# Patient Record
Sex: MALE | Race: BLACK OR AFRICAN AMERICAN | NOT HISPANIC OR LATINO | Employment: FULL TIME
[De-identification: names, ages, dates, MRNs, and addresses within clinical notes are randomized per-mention and may not be internally consistent; named-entity substitution may affect disease eponyms.]

---

## 2018-06-20 ENCOUNTER — RECORDS - HEALTHEAST (OUTPATIENT)
Dept: ADMINISTRATIVE | Facility: OTHER | Age: 28
End: 2018-06-20

## 2019-12-05 ENCOUNTER — HOSPITAL ENCOUNTER (EMERGENCY)
Facility: CLINIC | Age: 29
Discharge: HOME | End: 2019-12-05
Attending: EMERGENCY MEDICINE | Admitting: EMERGENCY MEDICINE

## 2019-12-05 VITALS
HEART RATE: 77 BPM | DIASTOLIC BLOOD PRESSURE: 67 MMHG | RESPIRATION RATE: 16 BRPM | SYSTOLIC BLOOD PRESSURE: 135 MMHG | TEMPERATURE: 98.3 F | OXYGEN SATURATION: 98 %

## 2019-12-05 DIAGNOSIS — J02.9 SORE THROAT: ICD-10-CM

## 2019-12-05 DIAGNOSIS — R09.89 CHEST CONGESTION: ICD-10-CM

## 2019-12-05 LAB
DEPRECATED S PYO AG THROAT QL EIA: NORMAL
SPECIMEN SOURCE: NORMAL

## 2019-12-05 PROCEDURE — 99283 EMERGENCY DEPT VISIT LOW MDM: CPT

## 2019-12-05 PROCEDURE — 87880 STREP A ASSAY W/OPTIC: CPT | Performed by: EMERGENCY MEDICINE

## 2019-12-05 PROCEDURE — 87081 CULTURE SCREEN ONLY: CPT | Performed by: EMERGENCY MEDICINE

## 2019-12-05 ASSESSMENT — ENCOUNTER SYMPTOMS
SHORTNESS OF BREATH: 1
VOMITING: 0
FEVER: 0
COUGH: 0
RHINORRHEA: 1
SORE THROAT: 1
BACK PAIN: 1

## 2019-12-05 NOTE — ED NOTES
Charge RN Lalo states patient & family member in [facility].  Did not want to wait & were ok with leaving without their discharge paperwork per DANIEL May.

## 2019-12-05 NOTE — ED AVS SNAPSHOT
Emergency Department  [address] [place] [id number]  Phone:  [phone]  Fax:  [phone]                                    Lui Garcia   MRN: [id number]    Department:   Emergency Department   Date of Visit:  12/5/2019           After Visit Summary Signature Page    I have received my discharge instructions, and my questions have been answered. I have discussed any challenges I see with this plan with the nurse or doctor.    ..........................................................................................................................................  Patient/Patient Representative Signature      ..........................................................................................................................................  Patient Representative Print Name and Relationship to Patient    ..................................................               ................................................  Date                                   Time    ..........................................................................................................................................  Reviewed by Signature/Title    ...................................................              ..............................................  Date                                               Time          22EPIC Rev 08/18

## 2019-12-05 NOTE — ED PROVIDER NOTES
History     Chief Complaint:  Shortness of Breath    TESSA Garcia is a 29 year old male who presents with shortness of breath. The patient states that four days ago he developed a runny nose that progressed into a sore throat and ear pain. The patient states that his back started hurting today and he was concerned that he might be developing pneumonia again. The patient denies fever, cough, or vomiting.     Allergies:  Amoxicillin  Penicillin    Medications:    Medications reviewed. No current medications.     Past Medical History:    Pneumonia  Generalized anxiety disorder  Sinusitis    Past Surgical History:    Surgical history reviewed. No pertinent surgical history.    Family History:    Family history reviewed. No pertinent family history.     Social History:  The patient was accompanied to the ED by mother.  Smoking Status: Never Smoker  Alcohol Use: No     Review of Systems   Constitutional: Negative for fever.   HENT: Positive for ear pain, rhinorrhea and sore throat.    Respiratory: Positive for shortness of breath. Negative for cough.    Gastrointestinal: Negative for vomiting.   Musculoskeletal: Positive for back pain.   All other systems reviewed and are negative.    Physical Exam     Patient Vitals for the past 24 hrs:   BP Temp Temp src Pulse Resp SpO2   12/05/19 0109 135/67 98.3  F (36.8  C) Oral 77 16 98 %     Physical Exam    General: Resting comfortably on the gurney  Eyes:  The pupils are equal and round    Conjunctivae and sclerae are normal  ENT:    Moist mucous membranes, mild posterior oropharynx erythema with no swelling, TM clear bilaterally. Voice normal  Neck:  Normal range of motion  CV:  Regular rate and rhythm    Skin warm and well perfused   Resp:  Lungs are clear    Non-labored    No rales    No wheezing   GI:  Abdomen is soft, there is no rigidity    No distension    No rebound tenderness     No abdominal tenderness  MS:  Normal muscular tone  Skin:  No rash or acute skin  lesions noted  Neuro:   Awake, alert.      Speech is normal and fluent.    Face is symmetric.     Moves all extremities equally  Psych: Normal affect.  Appropriate interactions.    Emergency Department Course   Laboratory:  Laboratory findings were communicated with the patient who voiced understanding of the findings.    Rapid Strep Test: negative  Strep Culture: Pending    Emergency Department Course:  Nursing notes and vitals reviewed.    0114 I performed an exam of the patient as documented above.     Rapid strep and culture obtained, results above.     Findings and plan explained to the Patient. Patient discharged home with instructions regarding supportive care, medications, and reasons to return. The importance of close follow-up was reviewed.     Impression & Plan    Medical Decision Making:  Lui Garcia is a 29 year old male who presents for evaluation of a sore throat and clinical evidence of mild pharyngitis.  The rapid strep test is negative, and formal culture has been set up in the lab. Likely viral illness. There is no clinical evidence of peritonsillar abscess, retropharyngeal abscess, Lemierre's Syndrome, epiglottis, or Moses's angina. The etiology is most likely viral. Has clear lungs, no hypoxia or tachycardia with no fever or cough so doubt pneumonia.  I have recommended treatment with analgesics, OTC cough/cold medicine. Reasons to return to ED discussed with patient.     Diagnosis:    ICD-10-CM    1. Sore throat J02.9    2. Chest congestion R09.89      Disposition:   The patient is discharged to home.    Scribe Disclosure:  LEEANNE Gloriajustin Palacios, am serving as a scribe at 1:19 AM on 12/5/2019 to document services personally performed by Haylee Espino MD based on my observations and the provider's statements to me.    EMERGENCY DEPARTMENT       Haylee Espino MD  12/05/19 4643

## 2019-12-07 LAB
BACTERIA SPEC CULT: NORMAL
Lab: NORMAL
SPECIMEN SOURCE: NORMAL

## 2020-03-16 ENCOUNTER — VIRTUAL VISIT (OUTPATIENT)
Dept: FAMILY MEDICINE | Facility: OTHER | Age: 30
End: 2020-03-16

## 2020-03-16 ENCOUNTER — COMMUNICATION - HEALTHEAST (OUTPATIENT)
Dept: SCHEDULING | Facility: CLINIC | Age: 30
End: 2020-03-16

## 2020-03-16 NOTE — PROGRESS NOTES
"Date: 2020 01:59:06  Clinician: Renita Bartlett  Clinician NPI: [id number]  Patient: Lui White  Patient : 1990  Patient Address: [address] [place] [address]  Patient Phone: [phone]  Visit Protocol: URI  Patient Summary:  Lui is a 29 year old ( : 1990 ) male who initiated a Visit for COVID-19 (Coronavirus) evaluation and screening. When asked the question \"Please sign me up to receive news, health information and promotions. \", Lui responded \"No\".    Lui states his symptoms started 1-2 days ago.   His symptoms consist of malaise, myalgia, chills, and a sore throat. He is experiencing mild difficulty breathing with activities but can speak normally in full sentences. Lui also feels feverish.   Symptom details     Sore throat: Lui reports having moderate throat pain (4-6 on a 10 point pain scale), does not have exudate on his tonsils, and can swallow liquids. He is not sure if the lymph nodes in his neck are enlarged. A rash has not appeared on the skin since the sore throat started.     Temperature: His current temperature is 99.6 degrees Fahrenheit.      Lui denies having ear pain, headache, rhinitis, facial pain or pressure, wheezing, cough, nasal congestion, and teeth pain. He also denies having recent facial or sinus surgery in the past 60 days.   Precipitating events  Within the past week, Lui has not been exposed to someone with strep throat. He has not recently been exposed to someone with influenza. Lui has not been in close contact with any high risk individuals.   Pertinent COVID-19 (Coronavirus) information  Lui has not traveled internationally or to the areas where COVID-19 (Coronavirus) is widespread in the last 14 days before the start of his symptoms.   Lui has not had close contact with a suspected or laboratory-confirmed COVID-19 patient within 14 days of symptom onset.   Lui is not a healthcare worker and does not work in a healthcare " facility.   Pertinent medical history  Lui has taken an antibiotic medication in the past month. Antibiotic details as reported by the patient (free text): Azythromycin   Lui does not need a return to work/school note.   Weight: 255 lbs   Lui does not smoke or use smokeless tobacco.   Weight: 255 lbs    MEDICATIONS: Vistaril oral, ALLERGIES: Penicillins  Clinician Response:  Dear Lui,  Based on the information provided, you have viral pharyngitis. This is a sore throat caused by a virus and is usually the first sign of a cold. Your sore throat should resolve in a couple days as other cold symptoms develop.  Unfortunately, there are no medications that can cure a cold, so treatment is focused on controlling symptoms as much as possible until you recover. Most people gradually feel better in 1-2 weeks.  Medication information  Because you have a viral infection, antibiotics will not help you get better. Treating a viral infection with antibiotics could actually make you feel worse.  Unless you are allergic to the over-the-counter medication(s) below, I recommend using:       Acetaminophen (Tylenol or store brand) oral tablet. Take 1-2 tablets by mouth every 4-6 hours to help with the discomfort.      Ibuprofen (Advil or store brand) 200 mg oral tablet. Take 1-3 tablets (200-600 mg) by mouth every 8 hours to help with the discomfort. Make sure to take the ibuprofen with food. Do not exceed 2400 mg in 24 hours.      Dextromethorphan (Robitussin DM or store brand). This medication is a cough suppressant that works by decreasing the feeling of needing to cough. Please follow the instructions on the package.     Over-the-counter medications do not require a prescription. Ask the pharmacist if you have any questions.  Self care  The following tips will keep you as comfortable as possible while you recover:     Rest    Drink plenty of water and other liquids    Use throat lozenges    Gargle with warm salt water (1/4  teaspoon of salt per 8 ounce glass of water)    Suck on frozen items such as popsicles or ice cubes    Drink hot tea with lemon and honey     When to seek care  Please be seen in a clinic or urgent care if new symptoms develop, or symptoms become worse.  Call 911 or go to the emergency room if you feel that your throat is closing off, you suddenly develop a rash, you are unable to swallow fluids, you are drooling, or you are having difficulty breathing.  Additional treatment plan   Dear Lui,  Based on the information you have provided, it does not appear you need Coronavirus (COVID-19) testing.   At this time, we recommend testing primarily for those people who have symptoms of cough and fever and have either traveled to a known area of infection or have been exposed to someone with laboratory confirmed Coronavirus by close contact.   Coronavirus - General Information:   The coronavirus infection starts within 14 days of an exposure.  Symptoms are those of a respiratory infection (such as fever, cough).   If you have not had symptoms by day 15, you should be considered uninfected by coronavirus.   Coronavirus - Symptoms:    The coronavirus can cause a respiratory illness, such as bronchitis or pneumonia.  The most common symptoms are: cough, fever, and shortness of breath.   Other symptoms are: body aches, chills, diarrhea, fatigue, headache, runny nose, and sore throat   Coronavirus - Exposure Risk Factors:   Exposure to a person who has been diagnosed with coronavirus.  Travel from an area with recent local transmission of coronavirus.  The CDC (www.cdc.gov) has the most up-to-date list of where the coronavirus outbreak is occurring.   Coronavirus - Spreading:    The virus likely spreads through respiratory droplets produced when a person coughs or sneezes. These respiratory droplets can travel approximately 6 feet and can remain on surfaces. Common disinfectants will kill the virus.  The CDC currently does not  recommend healthy people wear masks.   Coronavirus - Protect Yourself:    Avoid close contact with people known to have this new coronavirus infection.  Wash hands often with soap and water or alcohol-based hand .  Avoid touching the eyes, nose or mouth.   Thank you for limiting contact with others, wearing a simple mask to cover your cough, practice good hand hygiene habits and accessing our virtual services where possible to limit the spread of this virus.  For more information about COVID19 and options for caring for yourself at home, please visit the CDC website at https://www.cdc.gov/coronavirus/2019-ncov/about/steps-when-sick.html   For more options for care at [facility], please visit our website at [email or web address]     Diagnosis: Cough  Diagnosis ICD: R05

## 2020-03-19 ENCOUNTER — VIRTUAL VISIT (OUTPATIENT)
Dept: FAMILY MEDICINE | Facility: OTHER | Age: 30
End: 2020-03-19

## 2020-03-19 ENCOUNTER — NURSE TRIAGE (OUTPATIENT)
Dept: NURSING | Facility: CLINIC | Age: 30
End: 2020-03-19

## 2020-03-20 NOTE — PROGRESS NOTES
"Date: 2020 22:45:53  Clinician: BARBY Newby  Clinician NPI: [id number]  Patient: Lui White  Patient : 1990  Patient Address: [address] [place] [address]  Patient Phone: [phone]  Visit Protocol: URI  Patient Summary:  Lui is a 29 year old ( : 1990 ) male who initiated a Visit for COVID-19 (Coronavirus) evaluation and screening. When asked the question \"Please sign me up to receive news, health information and promotions. \", Lui responded \"No\".    Lui states his symptoms started suddenly 3-6 days ago. After his symptoms started, they improved and then got worse again.   His symptoms consist of malaise. He is experiencing mild difficulty breathing with activities but can speak normally in full sentences.   Lui denies having headache, fever, ear pain, rhinitis, facial pain or pressure, myalgias, wheezing, sore throat, cough, nasal congestion, chills, and teeth pain. He also denies having recent facial or sinus surgery in the past 60 days.    Pertinent COVID-19 (Coronavirus) information  Lui has not traveled internationally or to the areas where COVID-19 (Coronavirus) is widespread, including cruise ship travel in the last 14 days before the start of his symptoms.   Lui has not had a close contact with a laboratory-confirmed COVID-19 patient within 14 days of symptom onset. He also has not had a close contact with a suspected COVID-19 patient within 14 days of symptom onset.   Lui is not a healthcare worker and does not work in a healthcare facility.   Pertinent medical history  Lui has taken an antibiotic medication in the past month. Antibiotic details as reported by the patient (free text): VAL Poe does not need a return to work/school note.   Weight: 255 lbs   Lui does not smoke or use smokeless tobacco.   Weight: 255 lbs    MEDICATIONS: Vistaril oral, ALLERGIES: Penicillins  Clinician Response:  Dear Lui,   Based on the information you have " provided, you do have symptoms that are consistent with Coronavirus (COVID-19).  The coronavirus causes mild to severe respiratory illness with the most common symptoms including fever, cough and difficulty breathing. Unfortunately, many viruses cause similar symptoms and it can be difficult to distinguish between viruses, especially in mild cases, so we are presuming that anyone with cough or fever has coronavirus at this time.  Coronavirus/COVID-19 has reached the point of community spread in [place], meaning that we are finding the virus in people with no known exposure risk for lorna the virus. Given the increasing commonness of coronavirus in the community we are no longer testing patients who are not critically ill.  If you are a health care worker, you should refer to your employee health office for instructions about returning to work.  For everyone else who has cough or fever, you should assume you are infected with coronavirus. Accordingly, you should self-quarantine for seven days from the first day your symptoms started OR 72 hours after your cough and fever completely resolve - WHICHEVER is LONGER. You should call if you find increasing shortness of breath, wheezing or sustained fever above 101.5. If you are significantly short of breath or experience chest pain you should call 911 or report to the nearest emergency department for urgent evaluation.    Isolate yourself at home.   Do Not allow any visitors  Do Not go to work or school  Do Not go to [employer],  centers, shopping, or other public places.  Do Not shake hands.  Avoid close contact with others (hugging, kissing).   Protect Others:    Cover Your Mouth and Nose with a mask, disposable tissue or wash cloth to avoid spreading germs to others.  Wash your hands and face frequently with soap and water.   If you develop significant shortness of breath that prevents you from doing normal activities, please call 911 or proceed to the  [facility] emergency room and alert them immediately that you have been in self-isolation for possible coronavirus.   For more information about COVID19 and options for caring for yourself at home, please visit the CDC website at https://www.cdc.gov/coronavirus/2019-ncov/about/steps-when-sick.htmlFor more options for care at [facility], please visit our website at [email or web address]     Diagnosis: Cough  Diagnosis ICD: R05  Additional Clinician Notes: Lui, thank you for visiting [facility]. At this time you do not meet the criteria for testing. Due to a shortage of testing supplies we are reserving this for those who are severely ill in hospitals. You do have symptoms of a viral illness and I cannot exclude the Coronavirus; however, based on your medical history you are at low risk for personal complications. We are asking all patient's with symptoms like yours to self-quarantine at this time in their homes for a minimum of 7 days since symptoms started. Additionally, before returning to work or other social activities we ask that you are fever free for 72 hours (without the use of fever reducing medications) AND your respiratory symptoms have improved. I have some recommended over the counter medications listed above that can help manage your symptoms at this time.

## 2020-03-20 NOTE — TELEPHONE ENCOUNTER
COVID 19 Nurse Triage Plan    Please be aware that novel coronavirus (COVID-19) may be circulating in the community. If you develop symptoms such as fever, cough, or SOB or if you have concerns about the presence of another infection including coronavirus (COVID-19), please contact your health care provider or visit [email or web address].     Patient HAS known exposure, fever, cough or SOB in addition to reason for call.   Patient advised to stay home with mild symptoms and follow home care symptom management.     Instructions Given to Patient  Your symptoms could be due to COVID-19, but it also could be due to a number of other respiratory illnesses.  We are not doing testing at this time for COVID-19 unless symptoms are severe enough to require hospitalization.  It is recommended that you stay home to prevent the spread of your illness.  To do this follow these instructions:    Regardless of if you have been tested or not:  Patient who have symptoms (cough, fever, or shortness of breath), need to isolate for 7 days from when symptoms started OR 72 hours after fever resolves (without fever reducing medications) AND improvement of respiratory symptoms (whichever is longer).      Isolate yourself at home (in own room/own bathroom if possible)    Do Not allow any visitors    Do Not go to work or school    Do Not go to [employer],  centers, shopping, or other public places.    Do Not shake hands.    Avoid close and intimate contact with others (hugging, kissing).    Follow CDC recommendations for household cleaning of frequently touched services.     After the initial 7 days, continue to isolate yourself from household members as much as possible. To continue decrease the risk of community spread and exposure, you and any members of your household should limit activities in public for 14 days after starting home isolation.     You can reference the following CDC link for helpful home isolation/care  tips:  https://www.cdc.gov/coronavirus/2019-ncov/downloads/10Things.pdf    Protect Others:    Cover your mouth and nose with a mask, disposable tissue or wash cloth to avoid spreading germs to others.    Wash your hands and face frequently with soap and water.    Fever Medicines:    For fever relief, take acetaminophen or ibuprofen.    Treat fevers above 101  F (38.3  C) to lower fevers and make you more comfortable.     Acetaminophen (e.g., Tylenol): Take 650 mg (two 325 mg pills) by mouth every 4-6 hours as needed of regular strength Tylenol or 1,000 mg (two 500 mg pills) every 8 hours as needed of Extra Strength Tylenol.     Ibuprofen (e.g., Motrin, Advil): Take 400 mg (two 200 mg pills) by mouth every 6 hours as needed.     Acetaminophen is thought to be safer than ibuprofen or naproxen for people over 65 years old. Acetaminophen is in many OTC and prescription medicines. It might be in more than one medicine that you are taking. You need to be careful and not take an overdose. Before taking any medicine, read all the instructions on the package.    Caution - NSAIDs (e.g., ibuprofen, naproxen): Do not take nonsteroidal anti-inflammatory drugs (NSAIDs) if you have stomach problems, kidney disease, heart failure, or other contraindications to using this type of medicine. Do not take NSAID medicines for over 7 days without consulting your PCP. Do not take NSAID medicines if you are pregnant. Do not take NSAID medicines if you are also taking blood thinners.     Call Back If: Breathing difficulty develops or you become worse.    Thank you for limiting contact with others, wearing a simple mask to cover your cough, practice good hand hygiene habits and accessing our virtual services where possible to limit the spread of this virus.    For more information about COVID19 and options for caring for yourself at home, please visit the CDC website at https://www.cdc.gov/coronavirus/2019-ncov/about/steps-when-sick.html  For  more options for care at [facility], please visit our website at [email or web address]        Caller has done two virtual visit regarding covid 19 symptoms. Caller was diagnosed the viral sore throat during on. Caller states he was seen at [facility] and was given the corona virus test. Caller is anxious and is asking a lot of questions. Caller is able to talk fast without becoming winding or any noted shortness of breath noted. Caller states he has a o2 sat machine that states his oxygenation levels are at 97% on room air. Caller continually thinks he is short of breath and wants to know about being put on a ventilator and extreme measures for coronavirus respiratory symptoms. Patient has been instructed to self quarantine, and call back with any severe difficulty breathing. Caller states he was given ativan in [facility].           Additional Information    Negative: [1] Breathing stopped AND [2] hasn't returned    Negative: Choking on something    Negative: Severe difficulty breathing (e.g., struggling for each breath, speaks in single words)    Negative: Bluish (or gray) lips or face now    Negative: Difficult to awaken or acting confused (e.g., disoriented, slurred speech)    Negative: Passed out (i.e., lost consciousness, collapsed and was not responding)    Negative: Wheezing started suddenly after medicine, an allergic food or bee sting    Negative: Stridor    Negative: Slow, shallow and weak breathing    Negative: Sounds like a life-threatening emergency to the triager    Negative: Chest pain    Negative: [1] Wheezing (high pitched whistling sound) AND [2] previous asthma attacks or use of asthma medicines    Negative: [1] Difficulty breathing AND [2] only present when coughing    Negative: [1] Difficulty breathing AND [2] only from stuffy or runny nose    Negative: [1] MODERATE difficulty breathing (e.g., speaks in phrases, SOB even at rest, pulse 100-120) AND [2]  "NEW-onset or WORSE than normal    Negative: Wheezing can be heard across the room    Negative: Drooling or spitting out saliva (because can't swallow)    Negative: History of prior \"blood clot\" in leg or lungs (i.e., deep vein thrombosis, pulmonary embolism)    Negative: History of inherited increased risk of blood clots (e.g., Factor 5 Leiden, Anti-thrombin 3, Protein C or Protein S deficiency, Prothrombin mutation)    Negative: Recent illness requiring prolonged bedrest (i.e., immobilization)    Negative: Hip or leg fracture in past 2 months (e.g., had cast on leg or ankle)    Negative: Major surgery in the past month    Negative: Recent long-distance travel with prolonged time in car, bus, plane, or train (i.e., within past 2 weeks; 6 or  more hours duration)    Negative: Extra heart beats OR irregular heart beating   (i.e., \"palpitations\")    Negative: Fever > 103 F (39.4 C)    Negative: [1] Fever > 101 F (38.3 C) AND [2] age > 60    Negative: [1] Fever > 100.0 F (37.8 C) AND [2] bedridden (e.g., nursing home patient, CVA, chronic illness, recovering from surgery)    Negative: [1] Fever > 100.0 F (37.8 C) AND [2] diabetes mellitus or weak immune system (e.g., HIV positive, cancer chemo, splenectomy, chronic steroids)    Negative: [1] Periods where breathing stops and then resumes normally AND [2] bedridden (e.g., nursing home patient, CVA)    Negative: Pregnant or postpartum (< 1 month since delivery)    Negative: Patient sounds very sick or weak to the triager    Negative: [1] MILD difficulty breathing (e.g., minimal/no SOB at rest, SOB with walking, pulse <100) AND [2] NEW-onset or WORSE than normal    Negative: [1] Longstanding difficulty breathing (e.g., CHF, COPD, emphysema) AND [2] WORSE than normal    Negative: [1] Longstanding difficulty breathing AND [2] not responding to usual therapy    Negative: [1] Continuous (nonstop) coughing AND [2] keeps from working or sleeping    Negative: [1] MODERATE " longstanding difficulty breathing (e.g., speaks in phrases, SOB even at rest, pulse 100-120) AND [2] SAME as normal    Negative: [1] MILD longstanding difficulty breathing AND [2]  SAME as normal    Protocols used: BREATHING DIFFICULTY-A-AH

## 2020-03-21 ENCOUNTER — COMMUNICATION - HEALTHEAST (OUTPATIENT)
Dept: SCHEDULING | Facility: CLINIC | Age: 30
End: 2020-03-21

## 2020-03-22 ENCOUNTER — VIRTUAL VISIT (OUTPATIENT)
Dept: FAMILY MEDICINE | Facility: OTHER | Age: 30
End: 2020-03-22

## 2020-03-23 NOTE — PROGRESS NOTES
"Date: 2020 20:58:19  Clinician: Jasmyne Escalera  Clinician NPI: [id number]  Patient: Lui White  Patient : 1990  Patient Address: [address] [place] [address]  Patient Phone: [phone]  Visit Protocol: URI  Patient Summary:  Lui is a 29 year old ( : 1990 ) male who initiated a Visit for COVID-19 (Coronavirus) evaluation and screening. When asked the question \"Please sign me up to receive news, health information and promotions. \", Lui responded \"No\".    Lui states his symptoms started gradually 7-9 days ago. After his symptoms started, they improved and then got worse again.   His symptoms consist of a cough and malaise. He is experiencing mild difficulty breathing with activities but can speak normally in full sentences.   Symptom details   Cough: Lui coughs a few times an hour and his cough is not more bothersome at night. Phlegm comes into his throat when he coughs. He does not believe his cough is caused by post-nasal drip. The color of the phlegm is clear.    Lui denies having ear pain, rhinitis, facial pain or pressure, myalgias, wheezing, sore throat, nasal congestion, chills, teeth pain, headache, and fever. He also denies having recent facial or sinus surgery in the past 60 days.   Precipitating events  He has not recently been exposed to someone with influenza. Lui has not been in close contact with any high risk individuals.   Pertinent COVID-19 (Coronavirus) information  Lui has not traveled internationally or to the areas where COVID-19 (Coronavirus) is widespread, including cruise ship travel in the last 14 days before the start of his symptoms.   Lui has not had a close contact with a laboratory-confirmed COVID-19 patient within 14 days of symptom onset. He also has not had a close contact with a suspected COVID-19 patient within 14 days of symptom onset.   Lui is not a healthcare worker and does not work in a healthcare facility.   Pertinent " medical history  Lui has taken an antibiotic medication in the past month. Antibiotic details as reported by the patient (free text): Azythromycin   Lui does not need a return to work/school note.   Weight: 255 lbs   Lui does not smoke or use smokeless tobacco.   Additional information as reported by the patient (free text): Have a negative Coronavirus test from Monday night. Tightness in left chest not getting better.,   Weight: 255 lbs    MEDICATIONS: Vistaril oral, ALLERGIES: Penicillins  Clinician Response:  Dear Lui,   Based on the information you have provided, further evaluation in urgent care is indicated. You may walk in to one of our designated urgent care sites below that is prepared to see patients who have symptoms that could indicate Coronavirus (Covid-19).   For your information: At this time we will NOT perform coronavirus testing in urgent care sites. This test is currently being reserved for patients who are being admitted to the hospital.  [address] [address]. Hours: M-F 11am -- 9pm, Sat-Sun 9am-5pm  [address] [address]. Hour: M-F 1pm-9pm, Sat 8am-9pm, Sunday 10am-8pm  [address] [place] [address]. Hours: M-F 7am-7pm, Sat-Sun 8am-4pm.   PLEASE BRING DOCUMENTATION FROM THIS COMPLETED [facility] VISIT TO YOUR URGENT CARE VISIT.     For more information about COVID19 and options for caring for yourself at home, please visit the CDC website at https://www.cdc.gov/coronavirus/2019-ncov/about/steps-when-sick.html  For more options for care at [facility], please visit our website at [email or web address]       Diagnosis: Cough  Diagnosis ICD: R05

## 2020-03-29 ENCOUNTER — VIRTUAL VISIT (OUTPATIENT)
Dept: FAMILY MEDICINE | Facility: OTHER | Age: 30
End: 2020-03-29

## 2020-03-29 NOTE — PROGRESS NOTES
"Date: 2020 17:50:21  Clinician: Jasmyne Escalera  Clinician NPI: [id number]  Patient: Lui White  Patient : 1990  Patient Address: [address] [place] [address]  Patient Phone: [phone]  Visit Protocol: URI  Patient Summary:  Lui is a 29 year old ( : 1990 ) male who initiated a Visit for COVID-19 (Coronavirus) evaluation and screening. When asked the question \"Please sign me up to receive news, health information and promotions. \", Lui responded \"No\".    Lui states his symptoms started suddenly 10-13 days ago.   His symptoms consist of facial pain or pressure and myalgia. He is experiencing mild difficulty breathing with activities but can speak normally in full sentences.   Symptom details   Facial pain or pressure: The facial pain or pressure feels worse when bending over or leaning forward.    Lui denies having ear pain, rhinitis, teeth pain, headache, fever, chills, wheezing, sore throat, cough, nasal congestion, and malaise. He also denies taking antibiotic medication for the symptoms, having recent facial or sinus surgery in the past 60 days, and double sickening (worsening symptoms after initial improvement).   Precipitating events  He has recently been exposed to someone with influenza. Lui has not been in close contact with any high risk individuals.   Pertinent COVID-19 (Coronavirus) information  Lui has not traveled internationally or to the areas where COVID-19 (Coronavirus) is widespread, including cruise ship travel in the last 14 days before the start of his symptoms.   Lui has not had a close contact with a laboratory-confirmed COVID-19 patient within 14 days of symptom onset. He also has not had a close contact with a suspected COVID-19 patient within 14 days of symptom onset.   Lui is not a healthcare worker or a  and does not work in a healthcare facility. He does not live with a healthcare worker.   Pertinent medical history  " Lui does not need a return to work/school note.   Weight: 255 lbs   Lui does not smoke or use smokeless tobacco.   Additional information as reported by the patient (free text): Negative COVID-19 Test from March 17  Been in the House since   Weight: 255 lbs    MEDICATIONS: Vistaril oral, ALLERGIES: Penicillins  Clinician Response:  Dear Lui,   Based on the information you have provided, you do have symptoms that are consistent with Coronavirus (COVID-19).   The coronavirus causes mild to severe respiratory illness with the most common symptoms including fever, cough and difficulty breathing. Unfortunately, many viruses cause similar symptoms and it can be difficult to distinguish between viruses, especially in mild cases, so we are presuming that anyone with cough or fever has coronavirus at this time.  Coronavirus/COVID-19 has reached the point of community spread in [place], meaning that we are finding the virus in people with no known exposure risk for olrna the virus. Given the increasing commonness of coronavirus in the community we are no longer testing patients who are not critically ill.  If you are a health care worker, you should refer to your employee health office for instructions about testing and returning to work.  For everyone else who has cough or fever, you should assume you are infected with coronavirus. Since you will not be tested but have symptoms that may be consistent with coronavirus, the CDC recommends you stay in self-isolation until these three things have happened:    You have had no fever for at least 72 hours (that is three full days of no fever without the use of medicine that reduces fevers)    AND   Other symptoms have improved (for example, when your cough or shortness of breath have improved)   AND   At least 7 days have passed since your symptoms first appeared.   How to Isolate:    Isolate yourself at home.   Do Not allow any visitors  Do Not go to work or school   Do Not go to [employer],  centers, shopping, or other public places.  Do Not shake hands.  Avoid close contact with others (hugging, kissing).   Protect Others:    Cover Your Mouth and Nose with a mask, disposable tissue or wash cloth to avoid spreading germs to others.  Wash your hands and face frequently with soap and water.   Managing Symptoms:    At this time, we primarily recommend Tylenol (Acetaminophen) for fever or pain. If you have liver or kidney problems, contact your primary care provider for instructions on use of tylenol. Adults can take 650 mg (two 325 mg pills) by mouth every 4-6 hours as needed OR 1,000 mg (two 500 mg pills) every 8 hours as needed. MAXIMUM DAILY DOSE: 3,000mg. For children, refer to dosing on bottle based on age or weight.   If you develop significant shortness of breath that prevents you from doing normal activities, please call 911 or proceed to the nearest emergency room and alert them immediately that you have been in self-isolation for possible coronavirus.   If you have a higher risk medical condition such as cancer, heart failure, end stage renal disease on dialysis or have a transplant, please reach out to your specialist's clinic to advise them of your [facility] visit should you not improve within the next two days.  For more information about COVID19 and options for caring for yourself at home, please visit the CDC website at https://www.cdc.gov/coronavirus/2019-ncov/about/steps-when-sick.htmlFor more options for care at [facility], please visit our website at [email or web address]     Diagnosis: Acute upper respiratory infection, unspecified  Diagnosis ICD: J06.9  Additional Clinician Notes: You may return to work when the criteria above are met.

## 2020-04-06 ENCOUNTER — RECORDS - HEALTHEAST (OUTPATIENT)
Dept: ADMINISTRATIVE | Facility: OTHER | Age: 30
End: 2020-04-06

## 2020-04-09 ENCOUNTER — NURSE TRIAGE (OUTPATIENT)
Dept: NURSING | Facility: CLINIC | Age: 30
End: 2020-04-09

## 2020-04-10 NOTE — TELEPHONE ENCOUNTER
Seen at [facility] ER then later by [facility] for URI sx. Advised sx c/w Covid-19. Mild SOB and chest tightness but states he has had these sx all along and discussed these w/ ER and [facility] providers. Sx have not changed or become worse and no new sx. No fever. Cough improving. States he has anxiety also. States SOB and chest discomfort improve when he take his Ativan. No fever. Advised home care.    Reason for Disposition    1] COVID-19 infection diagnosed or suspected AND [2] mild symptoms (fever, cough) AND [2] no trouble breathing or other complications    Additional Information    Negative: SEVERE difficulty breathing (e.g., struggling for each breath, speaks in single words)    Negative: Difficult to awaken or acting confused (e.g., disoriented, slurred speech)    Negative: Bluish (or gray) lips or face now    Negative: Shock suspected (e.g., cold/pale/clammy skin, too weak to stand, low BP, rapid pulse)    Negative: Sounds like a life-threatening emergency to the triager    Negative: [1] COVID-19 suspected (e.g., cough, fever, shortness of breath) AND [2] public health department recommends testing    Negative: [1] COVID-19 exposure AND [2] no symptoms    Negative: COVID-19 and Breastfeeding, questions about    Negative: SEVERE or constant chest pain (Exception: mild central chest pain, present only when coughing)    Negative: MODERATE difficulty breathing (e.g., speaks in phrases, SOB even at rest, pulse 100-120)    Negative: Patient sounds very sick or weak to the triager    Negative: Fever > 103 F (39.4 C)    Negative: [1] Fever > 101 F (38.3 C) AND [2] age > 60    Negative: [1] Fever > 100.0 F (37.8 C) AND [2] bedridden (e.g., nursing home patient, CVA, chronic illness, recovering from surgery)    Negative: HIGH RISK patient (e.g., age > 64 years, diabetes, heart or lung disease, weak immune system)    Negative: Fever present > 3 days (72 hours)    Negative: [1] Fever returns after gone for over 24 hours  AND [2] symptoms worse or not improved    Negative: [1] Continuous (nonstop) coughing interferes with work or school AND [2] no improvement using cough treatment per protocol    Negative: Cough present > 3 weeks    Commented on: MILD difficulty breathing (e.g., minimal/no SOB at rest, SOB with walking, pulse <100)     Not new; seen for this, no worse    Commented on: Chest pain     Chest pain    Protocols used: CORONAVIRUS (COVID-19) DIAGNOSED OR [id number] 3.30.20

## 2020-04-12 ENCOUNTER — NURSE TRIAGE (OUTPATIENT)
Dept: NURSING | Facility: CLINIC | Age: 30
End: 2020-04-12

## 2020-04-12 ENCOUNTER — VIRTUAL VISIT (OUTPATIENT)
Dept: FAMILY MEDICINE | Facility: OTHER | Age: 30
End: 2020-04-12

## 2020-04-12 NOTE — PROGRESS NOTES
"Date: 2020 00:41:00  Clinician: Reniat Bartlett  Clinician NPI: [id number]  Patient: Lui White  Patient : 1990  Patient Address: [address] [place] [address]  Patient Phone: [phone]  Visit Protocol: URI  Patient Summary:  Lui is a 30 year old ( : 1990 ) male who initiated a Visit for COVID-19 (Coronavirus) evaluation and screening. When asked the question \"Please sign me up to receive news, health information and promotions. \", Lui responded \"No\".    Lui states his symptoms started gradually 2-3 weeks ago. After his symptoms started, they improved and then got worse again.   His symptoms consist of myalgia, nausea, a cough, and malaise. He is experiencing mild difficulty breathing with activities but can speak normally in full sentences.   Symptom details   Cough: Lui coughs a few times an hour and his cough is not more bothersome at night. Phlegm does not come into his throat when he coughs. He believes his cough is caused by post-nasal drip.    Lui denies having rhinitis, facial pain or pressure, enlarged lymph nodes, chills, diarrhea, vomiting, teeth pain, headache, fever, wheezing, sore throat, nasal congestion, and ear pain. He also denies taking antibiotic medication for the symptoms and having recent facial or sinus surgery in the past 60 days.   Precipitating events  He has recently been exposed to someone with influenza. Lui has not been in close contact with any high risk individuals.   Pertinent COVID-19 (Coronavirus) information  Lui has not traveled internationally or to the areas where COVID-19 (Coronavirus) is widespread, including cruise ship travel in the last 14 days before the start of his symptoms.   Lui does not work or volunteer as healthcare worker or a  and does not work or volunteer in a healthcare facility.   He does not live with a healthcare worker.   Lui has not had a close contact with a laboratory-confirmed " COVID-19 patient within 14 days of symptom onset. He also has not had a close contact with a suspected COVID-19 patient within 14 days of symptom onset.   Pertinent medical history  Lui does not need a return to work/school note.   Weight: 255 lbs   Lui does not smoke or use smokeless tobacco.   Additional information as reported by the patient (free text): Negative COVID-19 test on March 17  Have Anxiety  Not sure whether to present at emergency or not and risk being exposed  Feel frustrated and hopeless in what to do.  I've been in the house since March 17 besides driving to reduce stress.   Weight: 255 lbs    MEDICATIONS: Ativan oral, Vistaril oral, ALLERGIES: Penicillins  Clinician Response:  Dear Lui,      Based on the information you have provided, you do have symptoms that are consistent with Coronavirus (COVID-19).  The coronavirus causes mild to severe respiratory illness with the most common symptoms including fever, cough and difficulty breathing. Unfortunately, many viruses cause similar symptoms and it can be difficult to distinguish between viruses, especially in mild cases, so we are presuming that anyone with cough or fever has coronavirus at this time.  Coronavirus/COVID-19 has reached the point of community spread in [place], meaning that we are finding the virus in people with no known exposure risk for lorna the virus. Given the increasing commonness of coronavirus in the community we are no longer testing patients who are not critically ill.  If you are a health care worker, you should refer to your employee health office for instructions about testing and returning to work.  For everyone else who has cough or fever, you should assume you are infected with coronavirus. Since you will not be tested but have symptoms that may be consistent with coronavirus, the CDC recommends you stay in self-isolation until these three things have happened:    You have had no fever for at least 72  hours (that is three full days of no fever without the use of medicine that reduces fevers)    AND   Other symptoms have improved (for example, when your cough or shortness of breath have improved)   AND   At least 7 days have passed since your symptoms first appeared.   How to Isolate:   Isolate yourself at home.  Do Not allow any visitors  Do Not go to work or school  Do Not go to [employer],  centers, shopping, or other public places.  Do Not shake hands.  Avoid close contact with others (hugging, kissing).   Protect Others:   Cover Your Mouth and Nose with a mask, disposable tissue or wash cloth to avoid spreading germs to others.  Wash your hands and face frequently with soap and water.   We know it can be scary to hear that you might have COVID-19. Our team can help track your symptoms and make sure you are doing ok over the next two weeks using a program called [employer] to keep in touch. When you receive an email from [employer], please consider enrolling in our monitoring program. There is no cost to you for monitoring. Here is a URL where you can learn more: [email or web address]  Managing Symptoms:   At this time, we primarily recommend Tylenol (Acetaminophen) for fever or pain. If you have liver or kidney problems, contact your primary care provider for instructions on use of tylenol. Adults can take 650 mg (two 325 mg pills) by mouth every 4-6 hours as needed OR 1,000 mg (two 500 mg pills) every 8 hours as needed. MAXIMUM DAILY DOSE: 3,000mg. For children, refer to dosing on bottle based on age or weight.   If you develop significant shortness of breath that prevents you from doing normal activities, please call 911 or proceed to the nearest emergency room and alert them immediately that you have been in self-isolation for possible coronavirus.  If you have a higher risk medical condition such as cancer, heart failure, end stage renal disease on dialysis or have a transplant,  please reach out to your specialist's clinic to advise them of your [facility] visit should you not improve within the next two days.   For more information about COVID19 and options for caring for yourself at home, please visit the CDC website at https://www.cdc.gov/coronavirus/2019-ncov/about/steps-when-sick.htmlFor more options for care at [facility], please visit our website at [email or web address]    Diagnosis: Cough  Diagnosis ICD: R05

## 2020-04-12 NOTE — TELEPHONE ENCOUNTER
Caller has called 11 times since 03/16/20 regarding covid-19 symptoms and questions. Caller states he is having severe anxiety related to pandemic. Caller states his 02 sats are 98% on room air. Caller is able to carry out a conversation without any shortness of breath or difficulty breathing. Caller states he was tested negative for covid-19, but was reading and states it could be a false negative. Caller states everyone keeps telling him to quarantine at home and is not really doing anything for him. Triager gave care advice on how to treat symptoms at home. Call back if having severe shortness of breath. Caller advised to take anti-anxiety medication as prescribed. Caller verbalized and understands directives.  COVID 19 Nurse Triage Plan/Patient Instructions    Please be aware that novel coronavirus (COVID-19) may be circulating in the community. If you develop symptoms such as fever, cough, or SOB or if you have concerns about the presence of another infection including coronavirus (COVID-19), please contact your health care provider or visit [email or web address].     Disposition/Instructions    Patient to stay at home and follow home care protocol based instructions.     Thank you for limiting contact with others, wearing a simple mask to cover your cough, practice good hand hygiene habits and accessing our virtual services where possible to limit the spread of this virus.    For more information about COVID19 and options for caring for yourself at home, please visit the CDC website at https://www.cdc.gov/coronavirus/2019-ncov/about/steps-when-sick.html  For more options for care at [facility], please visit our website at [email or web address]    For more information, please use the [place] Department of Health ([facility]) COVID-19 Hotlines (Interpreters available):     Health questions: Phone Number: [phone] or [phone] and Hours: 7 a.m. to 7 p.m.    Schools and   questions: Phone Number: [phone] or [phone] and Hours 7 a.m. to 7 p.m.                  Reason for Disposition    Health Information question, no triage required and triager able to answer question    Additional Information    Negative: [1] Caller is not with the adult (patient) AND [2] reporting urgent symptoms    Negative: Lab result questions    Negative: Medication questions    Negative: Caller can't be reached by phone    Negative: Caller has already spoken to PCP or another triager    Negative: RN needs further essential information from caller in order to complete triage    Negative: Requesting regular office appointment    Negative: [1] Caller requesting NON-URGENT health information AND [2] PCP's office is the best resource    Protocols used: INFORMATION ONLY CALL-A-

## 2020-04-14 ENCOUNTER — COMMUNICATION - HEALTHEAST (OUTPATIENT)
Dept: SCHEDULING | Facility: CLINIC | Age: 30
End: 2020-04-14

## 2020-04-16 ENCOUNTER — VIRTUAL VISIT (OUTPATIENT)
Dept: FAMILY MEDICINE | Facility: OTHER | Age: 30
End: 2020-04-16

## 2020-04-16 NOTE — PROGRESS NOTES
"Date: 2020 18:42:38  Clinician: Ayla Bello  Clinician NPI: [id number]  Patient: Lui White  Patient : 1990  Patient Address: [address] [place] [address]  Patient Phone: [phone]  Visit Protocol: URI  Patient Summary:  Lui is a 30 year old ( : 1990 ) male who initiated a Visit for COVID-19 (Coronavirus) evaluation and screening. When asked the question \"Please sign me up to receive news, health information and promotions. \", Lui responded \"No\".    Lui states his symptoms started suddenly 1 month or more ago. After his symptoms started, they improved and then got worse again.   His symptoms consist of a cough and ear pain.   Symptom details   Cough: Lui coughs a few times an hour and his cough is not more bothersome at night. Phlegm comes into his throat when he coughs. He does not believe his cough is caused by post-nasal drip. The color of the phlegm is white.    Lui denies having rhinitis, enlarged lymph nodes, chills, diarrhea, facial pain or pressure, myalgias, vomiting, nausea, teeth pain, headache, wheezing, sore throat, nasal congestion, malaise, and fever. He also denies taking antibiotic medication for the symptoms and having recent facial or sinus surgery in the past 60 days. He is not experiencing dyspnea.   Precipitating events  He has not recently been exposed to someone with influenza. Lui has not been in close contact with any high risk individuals.   Pertinent COVID-19 (Coronavirus) information  Lui has not traveled internationally or to the areas where COVID-19 (Coronavirus) is widespread, including cruise ship travel in the last 14 days before the start of his symptoms.   Lui does not work or volunteer as healthcare worker or a  and does not work or volunteer in a healthcare facility.   He does not live with a healthcare worker.   Lui has not had a close contact with a laboratory-confirmed COVID-19 patient within 14 days " of symptom onset. He also has not had a close contact with a suspected COVID-19 patient within 14 days of symptom onset.   Pertinent medical history  Lui does not need a return to work/school note.   Weight: 255 lbs   Lui does not smoke or use smokeless tobacco.   Additional information as reported by the patient (free text): The reason I am contacting [facility] is because I was instructed to do so. My prescription for anti-anxiety medication  that was filled at [facility] by Dr. Lindsey has run out. I'm an trying to avoid returning the ER to be prescribe this medicine and my primary care doctors is out of state. Please Advise.   Weight: 255 lbs    MEDICATIONS: Ativan oral, Vistaril oral, ALLERGIES: Penicillins  Clinician Response:  Dear Lui,  Based on the information provided, you have a viral upper respiratory infection, otherwise known as a cold. Symptoms vary from person to person, but can include sneezing, coughing, a runny nose, sore throat, and headache and range from mild to severe.  Unfortunately, there are no medications that can cure a cold, so treatment is focused on controlling symptoms as much as possible. Most people gradually feel better until symptoms are gone in 1-2 weeks.  Medication information  Because you have a viral infection, antibiotics will not help you get better. Treating a viral infection with antibiotics could actually make you feel worse.  Unless you are allergic to the over-the-counter medication(s) below, I recommend using:       Acetaminophen (Tylenol or store brand) oral tablet. Take 1-2 tablets by mouth every 4-6 hours to help with the discomfort.    A decongestant such as Sudafed PE or store brand.     Over-the-counter medications do not require a prescription. Ask the pharmacist if you have any questions.  Self care  Steps you can take to be as comfortable as possible:     Rest.    Drink plenty of fluids.    Take a warm shower to loosen congestion    Use a cool-mist humidifier.     Take a spoonful of honey to reduce your cough.     When to seek care  Please be seen in a clinic or urgent care if any of the following occur:   New symptoms develop, or symptoms become worse   Call ahead before going to the clinic or urgent care.  COVID-19 (Coronavirus) General Information  With the increase in the number of COVID-19 (Coronavirus) cases, we understand you may have some questions. Below is some helpful information on COVID-19 (Coronavirus).  How can I protect myself and others from the COVID-19 (Coronavirus)?  Because there is currently no vaccine to prevent infection, the best way to protect yourself is to avoid being exposed to this virus. Put distance between yourself and other people if COVID-19 (Coronavirus) is spreading in your community. The virus is thought to spread mainly from person-to-person.     Between people who are in close contact with one another (within about 6 about) for a prolonged period (10 minutes or longer).    Through respiratory droplets produced when an infected person coughs or sneezes.     The CDC recommends the following additional steps to protect yourself and others:     Wash your hands often with soap and water for at least 20 seconds, especially after blowing your nose, coughing, or sneezing; going to the bathroom; and before eating or preparing food.  Use an alcohol-based hand  that contains at least 60 percent alcohol if soap and water are not available.        Avoid touching your eyes, nose and mouth with unwashed hands.    Avoid close contact with people who are sick.    Stay home when you are sick.    Cover your cough or sneeze with a tissue, then throw the tissue in the trash.    Clean and disinfect frequently touched objects and surfaces.     You can help stop COVID-19 (Coronavirus) by knowing the signs and symptoms:     Fever    Cough    Shortness of breath     Contact your healthcare provider if   Develop symptoms   AND   Have been in close  contact with a person known to have COVID-19 (Coronavirus) or live in or have recently traveled from an area with ongoing spread of COVID-19 (Coronavirus). Call ahead before you go to a doctor's office or emergency room. Tell them about your recent travel and your symptoms.   For the most up to date information, visit the CDC's website.  Self-monitoring  Self-monitoring means people should monitor themselves for fever by taking their temperatures twice a day and remain alert for a cough or difficulty breathing.  It is important to check your health two times each day for 14 days after a potential exposure to a person with COVID-19 (Coronavirus) or after travel from a location where COVID-19 (Coronavirus) is widespread. If you have been exposed to a person with COVID-19 (Coronavirus), it may take up to 14 days to know if you will get sick. Follow the steps below to check and record your health.     Take your temperature with a thermometer twice a day, once in the morning and once in the evening, and watch for a cough or difficulty breathing for 14 days.    Write down your temperature and any COVID-19 symptoms you may have: feeling feverish, coughing, or difficulty breathing.    Stay home from work or school.    Do not take public transportation, taxis, or ride-shares.    Avoid crowded places (such as shopping centers and movie theaters) and limit your activities in public.    Keep your distance from others (about 6 feet or 2 meters).    If you get sick with fever, cough, or trouble breathing, contact your healthcare provider and tell them about your recent travel and/or your symptoms.    If you need to seek medical care for other reasons, such as dialysis, call ahead to your doctor and tell them about your recent travel.     Steps to help prevent the spread of COVID-19 (Coronavirus) if you are sick  If you are sick with COVID-19 (Coronavirus) or suspect you are infected with the virus that causes COVID-19  "(Coronavirus), follow the steps below to help prevent the disease from spreading&nbsp;to people in your home and community.     Stay home except to get medical care. Home isolation may be started in consultation with your healthcare clinician.    Separate yourself from other people and animals in your home.    Call ahead before visiting your doctor if you have a medical appointment.    Wear a facemask when you are around other people.    Cover your cough and sneezes.    Clean your hands often.    Avoid sharing personal household items.    Clean and disinfect frequently touched objects and surfaces everyday.    You will need to have someone drop off medications or household supplies (if needed) at your house without coming inside or in contact with you or others living in your house.    Monitor your symptoms and seek prompt medical care if your illness is worsening (e.g. Difficulty breathing).    Discontinue home isolation only in consultation with your healthcare provider.     For more detailed and up to date information on what to do if you are sick, visit this link: What to Do If You Are Sick With COVID-19.  Do I need to be tested for COVID-19 (Coronavirus)?     Not everyone needs to be tested for COVID-19 (Coronavirus). Decisions on which patients receive testing will be based on the local spread of COVID-19 (Coronavirus) as well as the symptoms. Your healthcare provider will make the final decision on whether you should be tested.    In the meantime, if you have concerns that you may have been exposed, it is reasonable to practice \"social distancing.\"&nbsp; If you are ill with a cold or flu-like illness, please monitor your symptoms and call your healthcare provider if your symptoms worsen.    For more up to date information, visit this link: COVID-19 (Coronavirus) Frequently Asked Questions and Answers.      Diagnosis: Viral URI  Diagnosis ICD: J06.9  "

## 2020-04-28 ENCOUNTER — COMMUNICATION - HEALTHEAST (OUTPATIENT)
Dept: SCHEDULING | Facility: CLINIC | Age: 30
End: 2020-04-28

## 2020-05-02 ENCOUNTER — COMMUNICATION - HEALTHEAST (OUTPATIENT)
Dept: SCHEDULING | Facility: CLINIC | Age: 30
End: 2020-05-02

## 2020-05-04 ENCOUNTER — OFFICE VISIT - HEALTHEAST (OUTPATIENT)
Dept: FAMILY MEDICINE | Facility: CLINIC | Age: 30
End: 2020-05-04

## 2020-05-04 DIAGNOSIS — F41.0 GENERALIZED ANXIETY DISORDER WITH PANIC ATTACKS: ICD-10-CM

## 2020-05-04 DIAGNOSIS — F41.1 GENERALIZED ANXIETY DISORDER WITH PANIC ATTACKS: ICD-10-CM

## 2020-05-04 RX ORDER — FLUOXETINE 10 MG/1
TABLET, FILM COATED ORAL
Qty: 30 TABLET | Refills: 0 | Status: SHIPPED | OUTPATIENT
Start: 2020-05-04

## 2020-05-04 RX ORDER — CLONAZEPAM 0.5 MG/1
0.5 TABLET ORAL DAILY PRN
Qty: 30 TABLET | Refills: 0 | Status: SHIPPED | OUTPATIENT
Start: 2020-05-04

## 2020-05-04 ASSESSMENT — ANXIETY QUESTIONNAIRES
IF YOU CHECKED OFF ANY PROBLEMS ON THIS QUESTIONNAIRE, HOW DIFFICULT HAVE THESE PROBLEMS MADE IT FOR YOU TO DO YOUR WORK, TAKE CARE OF THINGS AT HOME, OR GET ALONG WITH OTHER PEOPLE: EXTREMELY DIFFICULT
GAD7 TOTAL SCORE: 21
1. FEELING NERVOUS, ANXIOUS, OR ON EDGE: NEARLY EVERY DAY
3. WORRYING TOO MUCH ABOUT DIFFERENT THINGS: NEARLY EVERY DAY
6. BECOMING EASILY ANNOYED OR IRRITABLE: NEARLY EVERY DAY
7. FEELING AFRAID AS IF SOMETHING AWFUL MIGHT HAPPEN: NEARLY EVERY DAY
2. NOT BEING ABLE TO STOP OR CONTROL WORRYING: NEARLY EVERY DAY
4. TROUBLE RELAXING: NEARLY EVERY DAY
5. BEING SO RESTLESS THAT IT IS HARD TO SIT STILL: NEARLY EVERY DAY

## 2020-05-04 ASSESSMENT — PATIENT HEALTH QUESTIONNAIRE - PHQ9: SUM OF ALL RESPONSES TO PHQ QUESTIONS 1-9: 18

## 2021-05-26 ASSESSMENT — PATIENT HEALTH QUESTIONNAIRE - PHQ9: SUM OF ALL RESPONSES TO PHQ QUESTIONS 1-9: 18

## 2021-05-28 ASSESSMENT — ANXIETY QUESTIONNAIRES: GAD7 TOTAL SCORE: 21

## 2021-06-01 VITALS — HEIGHT: 78 IN | WEIGHT: 260 LBS | BODY MASS INDEX: 29.29 KG/M2 | BODY MASS INDEX: 28.56 KG/M2

## 2021-06-06 NOTE — TELEPHONE ENCOUNTER
99.7 rectal    99.7 orally    Today has been feeling short of breath    Chest feels tight.    No wheezing    Back feels tight    No cough    Is having loose stools    No sore throat    No underlying heart or lung issues    O2 sats are at 98% on room air.    Has already done [facility] and was told that he has pharyngitis.    Questions answered and told to schedule an appointment with his pcp regarding his anxiety.    Mary Thomas, RN   [facility] Medication Refill and Triage Nurse  2:38 AM  3/16/2020       Reason for Disposition    [1] No CORONAVIRUS EXPOSURE BUT [2] questions about    Protocols used: CORONAVIRUS (2019-NCOV) EXPOSURE-A-AH

## 2021-06-06 NOTE — TELEPHONE ENCOUNTER
RN Assessment/Reason for Call:   Okay to leave Detailed Message  Lui calling in, shortness of breath; SaO2 98%  tight abdomen. Fever 100.2-100.3 2-3 days ago  Coronavius exposed 1 week< week;; others exposed international  No cough.  Urinated x2/24 hrs  [email or web address] 3/15 didn't recommend he get tested.    RN Action/Disposition:  Protocol recommends see Dr in 24 hrs.  Offered WIC, UC/ER  Call back if worse symptoms  Discussed home care measures.  Agrees to plan.     Kamala Merritt, DANIEL    [facility] Triage/med refill  3/16/2020  4:09 PM        Reason for Disposition    [1] No CORONAVIRUS EXPOSURE BUT [2] questions about    [1] Longstanding difficulty breathing AND [2] not responding to usual therapy    Protocols used: CORONAVIRUS (2019-NCOV) EXPOSURE-A-AH, BREATHING DIFFICULTY-A-AH

## 2021-06-07 NOTE — TELEPHONE ENCOUNTER
31 y/o male calls about anxiety/stress, negative COVID-19 test, continues with some upper respiratory symptoms - no hx of heart disease.  Swishing sound in ear gone now.  Also worried  About COVID- pneumonia.      Darcie Cheema RN Triage Nurse/[facility]  04/14/2020   1:15AM

## 2021-06-07 NOTE — TELEPHONE ENCOUNTER
Patient calling back for COVID19 lab results, advised him his test was NEGATIVE      Darcie Cheema, RN Triage Nurse/[facility] Connections  03/21/2020  5:25 AM    COVID 19 Nurse Triage Plan    Please be aware that novel coronavirus (COVID-19) may be circulating in the community. If you develop symptoms such as fever, cough, or SOB or if you have concerns about the presence of another infection including coronavirus (COVID-19), please contact your health care provider or visit [email or web address].     Patient HAS NO known exposure, fever, cough or SOB in addition to reason for call.    Additional General Information About COVID-19    COVID-19 - General Information:  Regardless of if you have been tested or not:  Patient who have symptoms (cough, fever, or shortness of breath), need to isolate for 7 days from when symptoms started OR 72 hours after fever resolves (without fever reducing medications) AND improvement of respiratory symptoms (whichever is longer).      Isolate yourself at home (in own room/own bathroom if possible)    Do Not allow any visitors    Do Not go to work or school    Do Not go to Rastafari,  centers, shopping, or other public places.    Do Not shake hands.    Avoid close and intimate contact with others (hugging, kissing).    Follow CDC recommendations for household cleaning of frequently touched services.     After the initial 7 days, continue to isolate yourself from household members as much as possible. To continue decrease the risk of community spread and exposure, you and any members of your household should limit activities in public for 14 days after starting home isolation.     You can reference the following CDC link for helpful home isolation/care tips:  https://www.cdc.gov/coronavirus/2019-ncov/downloads/10Things.pdf    COVID-19 - Symptoms:     The COVID-19 can cause a respiratory illness, such as bronchitis or pneumonia.    The most common symptoms are: cough, fever, and shortness of  breath.     Other symptoms are: body aches, chills, diarrhea, fatigue, headache, runny nose, and sore throat     COVID-19 - Exposure Risk Factors:    Exposure to a person who has been diagnosed with COVID-19 .    Travel from an area with recent local transmission of COVID-19 .    The CDC (www.cdc.gov) has the most up-to-date list of where the COVID-19 outbreak is occurring.    COVID-19 - Spreading:     The virus likely spreads through respiratory droplets produced when a person coughs or sneezes. These respiratory droplets can travel approximately 6 feet and can remain on surfaces.  Common disinfectants will kill the virus.    The CDC currently does not recommend healthy people wearing masks.    COVID-19 - Protect Yourself:     Avoid close contact with people known to have this new COVID-19 infection.    Wash hands often with soap and water or alcohol-based hand .    Avoid touching the eyes, nose or mouth.       Thank you for limiting contact with others, wearing a simple mask to cover your cough, practice good hand hygiene habits and accessing our virtual services where possible to limit the spread of this virus.    For more information about COVID19 and options for caring for yourself at home, please visit the CDC website at https://www.cdc.gov/coronavirus/2019-ncov/about/steps-when-sick.html  For more options for care at [facility], please visit our website at [email or web address]

## 2021-06-07 NOTE — TELEPHONE ENCOUNTER
Patient's mother called and states that she is not with the patient, that she had just got him back to his apartment. States that he is in such a bad place it is probably better to not talk to him.    Explained that Triage would not be able to discuss patient's health information with her. She is agreeable to this just is looking for general advice.     States that the patient has been to ED several times since the beginning of the Coronavirus pandemic. Patient is panicked about the virus.     Through the EDs, the patient has had 2 neg covid tests. Has had EKG and chest X-Rays.     Patient has Extreme Panic that never goes away. Eventually the ED gave him a prescription for Ativan. Now thinks that he is going through withdrawal from ativan. Mother states that he has taken it very sparingly due to he is afraid of it and doesn't think he is going through withdrawal as she did not take it often enough.     Patient's panic is making him not sleep, mother has to drive him around for 3-8 hours daily just to keep him calm. He is paranoid. Patient seems to get some relief from going to an ED, but really only [facility] ED.     Mother states she knows that patient needs to follow up with a behavioral health provider, but would like to know what to do in the meanwhile. States they have told her that he is not mentally ill enough to be hospitalized.     Patient does not take any daily medications to manage his depression and anxiety.     Encouraged OV and at the very least a ED visit. Mother seems to be at her wits end.    Reason for Disposition    Patient sounds very upset or troubled to the triager    Symptoms interfere with work or school    Symptoms interfere with sleep    Protocols used: ANXIETY AND PANIC ATTACK-A-PRINCE Poole RN Triage Nurse Advisor

## 2021-06-07 NOTE — TELEPHONE ENCOUNTER
RN Triage  Was in ER at [facility] March 17th and March 18th. Was given ativan for anxiety and panic attacks.  Concerned he is having withdrawals from his ativan- states he is feeling 'electric shocks' and blurry vision, more panic symptoms and nausea. He also endorses feeling short of breath. He does not want to go to the emergency department because of fear of coronavirus. I attempted to explain precautions in place however he was still leery of my suggestions.    I advised Lui that he should be seen in the emergency department to which he did not want to do. I offered to help him set up a telephone visit with a provider to which he was agreeable.      Skye Treadwell RN  [facility] Nurse Advisor    COVID 19 Nurse Triage Plan/Patient Instructions    Please be aware that novel coronavirus (COVID-19) may be circulating in the community. If you develop symptoms such as fever, cough, or SOB or if you have concerns about the presence of another infection including coronavirus (COVID-19), please contact your health care provider or visit [email or web address].     Disposition/Instructions    Patient to have scheduled Telephone Visit with a provider. Follow System Ambulatory Workflow for COVID 19.     The clinic staff will assist you to schedule an appointment to complete the Telephone Visit with a provider during normal clinic hours.       Call Back If: Your symptoms worsen before you are able to complete your Telephone Visit with a provider.        Thank you for limiting contact with others, wearing a simple mask to cover your cough, practice good hand hygiene habits and accessing our virtual services where possible to limit the spread of this virus.    For more information about COVID19 and options for caring for yourself at home, please visit the CDC website at https://www.cdc.gov/coronavirus/2019-ncov/about/steps-when-sick.html  For more options for care at [facility], please visit our website at  [email or web address]    For more information, please use the [place] Department of Health COVID-19 Website: https://www.health.[facility].us/diseases/coronavirus/index.html  [place] Department of Health ([facility]) COVID-19 Hotlines (Interpreters available):      Health questions: Phone Number: [phone] or 1-626.457.1182 and Hours: 7 a.m. to 7 p.m.    Schools and  questions: Phone Number: [phone] or [phone] and Hours 7 a.m. to 7 p.m.                        Reason for Disposition    [1] Difficulty breathing AND [2] persists > 10 minutes AND [3] not relieved by reassurance provided by triager    [1] Lightheadedness or dizziness AND [2] persists > 10 minutes AND [3] not relieved by reassurance provided by triager    [1] Symptoms of anxiety or panic AND [2] has not been evaluated for this by physician    Protocols used: ANXIETY AND PANIC ATTACK-A-

## 2021-06-07 NOTE — PROGRESS NOTES
"Lui Garcia is a 30 y.o. male who is being evaluated via a billable telephone visit.      The patient has been notified of following:     \"This telephone visit will be conducted via a call between you and your physician/provider. We have found that certain health care needs can be provided without the need for a physical exam.  This service lets us provide the care you need with a short phone conversation.  If a prescription is necessary we can send it directly to your pharmacy.  If lab work is needed we can place an order for that and you can then stop by our lab to have the test done at a later time.    Telephone visits are billed at different rates depending on your insurance coverage. During this emergency period, for some insurers they may be billed the same as an in-person visit.  Please reach out to your insurance provider with any questions.    If during the course of the call the physician/provider feels a telephone visit is not appropriate, you will not be charged for this service.\"    Patient has given verbal consent to a Telephone visit? Yes    What phone number would you like to be contacted at? [phone]    Patient would like to receive their AVS by AVS Preference: [place].    Patient presents via telephone with concerns regarding anxiety.  A chart review reveals that he has been seen numerous times in the last 6 months for this.  States he has always had underlying anxiety, but the COVID19 pandemic has really triggered things for him.  He has been seen in the ED with symptoms of shortness of breath.  He has been tested twice for COVID19, and both tests have been negative.  Patient was most recently prescribed lorazepam, but he is concerned that he is experiencing withdrawal symptoms from this.  Reports he is having \"electrical shock\" sensations throughout his body.  Patient is not currently working.  He was previously traveling frequently playing basketball.  He is having multiple panic attacks " throughout the day when he thinks about possibly being infected with the coronavirus.  He is not sleeping well at night.  Patient has tried hydroxyzine, but this makes him too tired.  He is not currently on any medications.  States he was on fluoxetine in the past.  He did not love being on the medication, as he recalls it may have caused some digestive symptoms.  Patient denies any depressive symptoms.  He is walking frequently throughout the day.  He has responded well to clonazepam in the past.  I did review patient's , which is consistent with his reported use of benzodiazepines.      PHQ-9 Total Score: 18 (5/4/2020  8:00 AM)    ASHANTI 7 Total Score: 21 (5/4/2020  8:00 AM)        Assessment/Plan:  1. Generalized anxiety disorder with panic attacks  Patient with generalized anxiety with panic attacks secondary to COVID19 pandemic.  Discussed options for treatment.  I would advise restarting an SSRI due to chronicity of symptoms.  Patient hesitant, but willing to restart his fluoxetine.  We will start this at a very low dose to avoid any agitation.  I did discuss my concerns regarding prolonged use of benzodiazepines, mainly due to habit formation.  Patient is aware of this possibility.  Discussed that this should be used for short-term use as the fluoxetine is becoming effective.  I did prescribe him clonazepam to use as needed for panic attacks.  I advised him not to use more than 1 dose per day.  No refills until this follow-up.  Referral placed to start some counseling.  Follow-up appointment made in 1 month.  Will let me know if he is having any concerns prior to that.  - clonazePAM (KLONOPIN) 0.5 MG tablet; Take 1 tablet (0.5 mg total) by mouth daily as needed for anxiety.  Dispense: 30 tablet; Refill: 0  - FLUoxetine (PROZAC) 10 MG tablet; Take 1/2 tablet (5 mg) x 1 week, then increase to 1 full tablet (10 mg) by mouth daily.  Dispense: 30 tablet; Refill: 0  - AMB REFERRAL TO MENTAL HEALTH AND ADDICTION  -  Adult (18+); Outpatient Treatment; Individual/Couples/Family/Group Therapy/Health Psychology; [facility] [phone]        Phone call duration:  23 minutes  Start: 904  End: 457    Michelle Stein NP

## 2021-06-16 PROBLEM — F41.1 GENERALIZED ANXIETY DISORDER WITH PANIC ATTACKS: Status: ACTIVE | Noted: 2020-05-04

## 2021-06-16 PROBLEM — F41.0 GENERALIZED ANXIETY DISORDER WITH PANIC ATTACKS: Status: ACTIVE | Noted: 2020-05-04

## 2021-08-29 ENCOUNTER — HEALTH MAINTENANCE LETTER (OUTPATIENT)
Age: 31
End: 2021-08-29

## 2021-10-10 ENCOUNTER — HOSPITAL ENCOUNTER (EMERGENCY)
Facility: CLINIC | Age: 31
Discharge: HOME | End: 2021-10-10
Attending: EMERGENCY MEDICINE | Admitting: EMERGENCY MEDICINE

## 2021-10-10 ENCOUNTER — APPOINTMENT (OUTPATIENT)
Dept: GENERAL RADIOLOGY | Facility: CLINIC | Age: 31
End: 2021-10-10
Attending: EMERGENCY MEDICINE

## 2021-10-10 VITALS
OXYGEN SATURATION: 98 % | WEIGHT: 260 LBS | TEMPERATURE: 97.9 F | RESPIRATION RATE: 16 BRPM | HEART RATE: 64 BPM | DIASTOLIC BLOOD PRESSURE: 82 MMHG | SYSTOLIC BLOOD PRESSURE: 118 MMHG | HEIGHT: 78 IN | BODY MASS INDEX: 30.08 KG/M2

## 2021-10-10 DIAGNOSIS — R07.89 CHEST DISCOMFORT: ICD-10-CM

## 2021-10-10 DIAGNOSIS — J06.9 VIRAL URI WITH COUGH: ICD-10-CM

## 2021-10-10 LAB
ALBUMIN SERPL-MCNC: 4.2 G/DL (ref 3.4–5)
ALP SERPL-CCNC: 68 U/L (ref 40–150)
ALT SERPL W P-5'-P-CCNC: 40 U/L (ref 0–70)
ANION GAP SERPL CALCULATED.3IONS-SCNC: 4 MMOL/L (ref 3–14)
AST SERPL W P-5'-P-CCNC: 23 U/L (ref 0–45)
ATRIAL RATE - MUSE: 79 BPM
BASOPHILS # BLD AUTO: 0 10E3/UL (ref 0–0.2)
BASOPHILS NFR BLD AUTO: 0 %
BILIRUB SERPL-MCNC: 0.3 MG/DL (ref 0.2–1.3)
BUN SERPL-MCNC: 14 MG/DL (ref 7–30)
CALCIUM SERPL-MCNC: 9.1 MG/DL (ref 8.5–10.1)
CHLORIDE BLD-SCNC: 104 MMOL/L (ref 94–109)
CO2 SERPL-SCNC: 29 MMOL/L (ref 20–32)
CREAT SERPL-MCNC: 1.14 MG/DL (ref 0.66–1.25)
D DIMER PPP FEU-MCNC: 0.3 UG/ML FEU (ref 0–0.5)
DIASTOLIC BLOOD PRESSURE - MUSE: NORMAL MMHG
EOSINOPHIL # BLD AUTO: 0.1 10E3/UL (ref 0–0.7)
EOSINOPHIL NFR BLD AUTO: 1 %
ERYTHROCYTE [DISTWIDTH] IN BLOOD BY AUTOMATED COUNT: 12.6 % (ref 10–15)
GFR SERPL CREATININE-BSD FRML MDRD: 86 ML/MIN/1.73M2
GLUCOSE BLD-MCNC: 95 MG/DL (ref 70–99)
HCT VFR BLD AUTO: 43.4 % (ref 40–53)
HGB BLD-MCNC: 14.8 G/DL (ref 13.3–17.7)
HOLD SPECIMEN: NORMAL
IMM GRANULOCYTES # BLD: 0 10E3/UL
IMM GRANULOCYTES NFR BLD: 0 %
INTERPRETATION ECG - MUSE: NORMAL
LYMPHOCYTES # BLD AUTO: 1.2 10E3/UL (ref 0.8–5.3)
LYMPHOCYTES NFR BLD AUTO: 15 %
MCH RBC QN AUTO: 32.3 PG (ref 26.5–33)
MCHC RBC AUTO-ENTMCNC: 34.1 G/DL (ref 31.5–36.5)
MCV RBC AUTO: 95 FL (ref 78–100)
MONOCYTES # BLD AUTO: 0.5 10E3/UL (ref 0–1.3)
MONOCYTES NFR BLD AUTO: 6 %
NEUTROPHILS # BLD AUTO: 6.2 10E3/UL (ref 1.6–8.3)
NEUTROPHILS NFR BLD AUTO: 78 %
NRBC # BLD AUTO: 0 10E3/UL
NRBC BLD AUTO-RTO: 0 /100
P AXIS - MUSE: 32 DEGREES
PLATELET # BLD AUTO: 182 10E3/UL (ref 150–450)
POTASSIUM BLD-SCNC: 3.8 MMOL/L (ref 3.4–5.3)
PR INTERVAL - MUSE: 172 MS
PROT SERPL-MCNC: 8.3 G/DL (ref 6.8–8.8)
QRS DURATION - MUSE: 106 MS
QT - MUSE: 372 MS
QTC - MUSE: 426 MS
R AXIS - MUSE: 72 DEGREES
RBC # BLD AUTO: 4.58 10E6/UL (ref 4.4–5.9)
SODIUM SERPL-SCNC: 137 MMOL/L (ref 133–144)
SYSTOLIC BLOOD PRESSURE - MUSE: NORMAL MMHG
T AXIS - MUSE: 38 DEGREES
TROPONIN I SERPL-MCNC: <0.015 UG/L (ref 0–0.04)
VENTRICULAR RATE- MUSE: 79 BPM
WBC # BLD AUTO: 8 10E3/UL (ref 4–11)

## 2021-10-10 PROCEDURE — 80053 COMPREHEN METABOLIC PANEL: CPT | Performed by: EMERGENCY MEDICINE

## 2021-10-10 PROCEDURE — 85025 COMPLETE CBC W/AUTO DIFF WBC: CPT | Performed by: EMERGENCY MEDICINE

## 2021-10-10 PROCEDURE — 85379 FIBRIN DEGRADATION QUANT: CPT | Performed by: EMERGENCY MEDICINE

## 2021-10-10 PROCEDURE — 84484 ASSAY OF TROPONIN QUANT: CPT | Performed by: EMERGENCY MEDICINE

## 2021-10-10 PROCEDURE — 99285 EMERGENCY DEPT VISIT HI MDM: CPT | Mod: 25

## 2021-10-10 PROCEDURE — 36415 COLL VENOUS BLD VENIPUNCTURE: CPT | Performed by: EMERGENCY MEDICINE

## 2021-10-10 PROCEDURE — 93005 ELECTROCARDIOGRAM TRACING: CPT

## 2021-10-10 PROCEDURE — 258N000003 HC RX IP 258 OP 636: Performed by: EMERGENCY MEDICINE

## 2021-10-10 PROCEDURE — 71046 X-RAY EXAM CHEST 2 VIEWS: CPT

## 2021-10-10 RX ADMIN — SODIUM CHLORIDE 1000 ML: 9 INJECTION, SOLUTION INTRAVENOUS at 14:57

## 2021-10-10 ASSESSMENT — ENCOUNTER SYMPTOMS
SHORTNESS OF BREATH: 0
COUGH: 1
NAUSEA: 0
SORE THROAT: 1
FEVER: 0
VOMITING: 0
DIARRHEA: 0
MYALGIAS: 0

## 2021-10-10 ASSESSMENT — MIFFLIN-ST. JEOR: SCORE: 2304.35

## 2021-10-10 NOTE — DISCHARGE INSTRUCTIONS
Discharge Instructions  Chest Pain    You have been seen today for chest pain or discomfort.  At this time, your provider has found no signs that your chest pain is due to a serious or life-threatening condition, (or you have declined more testing and/or admission to the hospital). However, sometimes there is a serious problem that does not show up right away. Your evaluation today may not be complete and you may need further testing and evaluation.     Generally, every Emergency Department visit should have a follow-up clinic visit with either a primary or a specialty clinic/provider. Please follow-up as instructed by your emergency provider today.  Return to the Emergency Department if:  Your chest pain changes, gets worse, starts to happen more often, or comes with less activity.  You are newly short of breath.  You get very weak or tired.  You pass out or faint.  You have any new symptoms, like fever, cough, numb legs, or you cough up blood.  You have anything else that worries you.    Until you follow-up with your regular provider, please do the following:  Take one aspirin daily unless you have an allergy or are told not to by your provider.  If a stress test appointment has been made, go to the appointment.  If you have questions, contact your regular provider.  Follow-up with your regular provider/clinic as directed; this is very important.    If you were given a prescription for medicine here today, be sure to read all of the information (including the package insert) that comes with your prescription.  This will include important information about the medicine, its side effects, and any warnings that you need to know about.  The pharmacist who fills the prescription can provide more information and answer questions you may have about the medicine.  If you have questions or concerns that the pharmacist cannot address, please call or return to the Emergency Department.       Remember that you can always come  back to the Emergency Department if you are not able to see your regular provider in the amount of time listed above, if you get any new symptoms, or if there is anything that worries you.    Discharge Instructions  Upper Respiratory Infection    The upper respiratory tract includes the sinuses, nasal passages, pharynx, and larynx. A URI, or upper respiratory infection, is an infection of any of the parts of the upper airway. Symptoms include runny nose, congestion, sneezing, sore throat, cough, and fever. URIs are almost always caused by a virus. Antibiotics do not help with viral infections, so are generally not prescribed. A URI is very contagious through coughing and nasal secretions; make sure you wash your hands often and clean surfaces after sneezing, coughing or touching them. While you should start to improve in 3 - 5 days, remember that sometimes a cough can linger for several weeks.    Generally, every Emergency Department visit should have a follow-up clinic visit with either a primary or a specialty clinic/provider. Please follow-up as instructed by your emergency provider today.    Return to the Emergency Department if:  Any of your symptoms get much worse.  You seem very sick, like being too weak to get up.  You have chest pain or shortness of breath.   You have a severe headache.  You are vomiting (throwing up) so much you cannot keep fluids or medicines down.  You have confusion or seem unusually drowsy.  You have a seizure.    What can I do to help myself?  Fill any prescriptions the provider gave you and take them right away  If you have a fever, get plenty of rest and drink lots of fluids, especially water.  Using a humidifier or saline nose spray will also help loosen mucous.   Clothes or blankets will not change your fever. Do what is comfortable for you.  Bathing or sponging in lukewarm water may help you feel better.  Acetaminophen (Tylenol ) or ibuprofen (Advil , Motrin ) will help bring  fever down and may help you feel more comfortable. Be sure to read and follow the package directions, and ask your provider if you have questions.  Do not drink alcohol.  Decongestants may help you feel better. You may use decongestant nose sprays Afrin  (oxymetazoline) or Maximo-Synephrine  (phenylephrine hydrochloride) for up to 3 days, or may use a decongestant tablet like Sudafed  (pseudoephedrine).  If you were given a prescription for medicine here today, be sure to read all of the information (including the package insert) that comes with your prescription.  This will include important information about the medicine, its side effects, and any warnings that you need to know about.  The pharmacist who fills the prescription can provide more information and answer questions you may have about the medicine.  If you have questions or concerns that the pharmacist cannot address, please call or return to the Emergency Department.   Remember that you can always come back to the Emergency Department if you are not able to see your regular provider in the amount of time listed above, if you get any new symptoms, or if there is anything that worries you.

## 2021-10-10 NOTE — ED PROVIDER NOTES
"  History     Chief Complaint:  Chest discomfort and palpitations    The history is provided by the patient.      Lui Garcia is a 31 year old male with a history of COVID-19 infection in August of this year and anxiety who presents with chest discomfort and palpitations for the last couple days that started when he also noted onset of a cough, sore throat and postnasal drip. He denies shortness of breath, vomiting, diarrhea, fevers, myalgias, anosmia and ageusia. Of note, he had COVID-19 approximately 2 months ago but denies having severe illness at that time. He denies using tobacco and alcohol.  He comes in mainly concerned that he might have a blood clot or possibly pneumonia.    Review of Systems   Constitutional: Negative for fever.   HENT: Positive for postnasal drip and sore throat.    Respiratory: Positive for cough. Negative for shortness of breath.    Cardiovascular: Positive for chest pain (discomfort).   Gastrointestinal: Negative for diarrhea, nausea and vomiting.   Musculoskeletal: Negative for myalgias.   All other systems reviewed and are negative.      Allergies:  Penicillins  Peanuts    Medications:    The patient is currently on no regular medications.    Past Medical History:    Anxiety  COVID-19 infection     Social History:  Presents to the ED alone.  He does MMA.    Physical Exam     Patient Vitals for the past 24 hrs:   BP Temp Temp src Pulse Resp SpO2 Height Weight   10/10/21 1500 118/82 -- -- 64 -- 98 % -- --   10/10/21 1224 (!) 148/82 97.9  F (36.6  C) Temporal 78 16 100 % 2.032 m (6' 8\") 117.9 kg (260 lb)       Physical Exam  Nursing note and vitals reviewed.  Constitutional:  Oriented to person, place, and time. Cooperative.   HENT:   Nose:    Nose normal.   Mouth/Throat:   Face mask in place.  Eyes:    Conjunctivae normal and EOM are normal.      Pupils are equal, round, and reactive to light.   Neck:    Trachea normal.   Cardiovascular:  Normal rate, regular rhythm, normal heart " sounds and normal pulses. No murmur heard.  Pulmonary/Chest:  Effort normal and breath sounds normal.   Abdominal:   Soft. Normal appearance and bowel sounds are normal.      There is no tenderness.      There is no rebound and no CVA tenderness.   Musculoskeletal:  Extremities atraumatic x 4.   Lymphadenopathy:  No cervical adenopathy.   Neurological:   Alert and oriented to person, place, and time. Normal strength.      No cranial nerve deficit or sensory deficit. GCS eye subscore is 4. GCS verbal subscore is 5. GCS motor subscore is 6.   Skin:    Skin is intact. No rash noted.   Psychiatric:   Normal mood and affect.    Emergency Department Course   ECG:  ECG taken at 1230, ECG read at 1434  Normal sinus rhythm.  Normal ECG.   Rate 79 bpm. HI interval 172 ms. QRS duration 106 ms. QT/QTc 372/426 ms. P-R-T axes 32 72 38.     Imaging:  XR Chest 2 Views   Final Result   IMPRESSION: Negative chest. Lungs are clear. No pleural effusion or pneumothorax. Normal heart size.                Laboratory:  Labs Ordered and Resulted from Time of ED Arrival Up to the Time of Departure from the ED   TROPONIN I - Normal   COMPREHENSIVE METABOLIC PANEL - Normal   D DIMER QUANTITATIVE - Normal    Narrative:     This D-dimer assay is intended for use in conjunction with a clinical pretest probability assessment model to exclude pulmonary embolism (PE) and deep venous thrombosis (DVT) in outpatients suspected of PE or DVT. The cut-off value is 0.50 ug/mL FEU.   EXTRA BLUE TOP TUBE   EXTRA RED TOP TUBE   EXTRA GREEN TOP (LITHIUM HEPARIN) TUBE   EXTRA PURPLE TOP TUBE   CBC WITH PLATELETS AND DIFFERENTIAL   EXTRA TUBE    Narrative:     The following orders were created for panel order [place] Draw.  Procedure                               Abnormality         Status                     ---------                               -----------         ------                     Extra Blue Top Tube[[id number]]                              Final  result               Extra Red Top Tube[[id number]]                               Final result               Extra Green Top (Lithium...[[id number]]                      Final result               Extra Purple Top Tube[[id number]]                            Final result                 Please view results for these tests on the individual orders.   CBC WITH PLATELETS & DIFFERENTIAL    Narrative:     The following orders were created for panel order CBC with platelets + differential.  Procedure                               Abnormality         Status                     ---------                               -----------         ------                     CBC with platelets and d...[[id number]]                      Final result                 Please view results for these tests on the individual orders.       Emergency Department Course:    Reviewed:  I reviewed nursing notes, vitals, past history and care everywhere    Assessments:  1433 I obtained history and examined the patient as noted above.   1532 I rechecked the patient and explained x ray findings. He was ready for discharge.     Interventions:  1457 NS 1L IV Bolus    Disposition:  The patient was discharged to home.    Impression & Plan    Medical Decision Making:  This is a 31-year-old male came in concerned he might have a DVT or PE based on his current symptoms but also because he had COVID-19 a couple of months ago.  He has normal vital signs here and a normal exam.  I proceeded with the above work-up including the blood work, EKG, and subsequent chest x-ray.  With a negative D-dimer, I feel that pulmonary embolism has been sufficiently ruled out.  His chest x-ray also was negative as is the rest of his blood work.  Therefore I do not feel that he has anything serious such as cardiac ischemia, pulmonary edema, or pneumonia.  I reassured him that he likely has a viral URI and nothing more serious.  I recommended returning with any concerns or worsening  symptoms and following up with his physician as needed.    Diagnosis:    ICD-10-CM    1. Chest discomfort  R07.89    2. Viral URI with cough  J06.9        Scribe Disclosure:  I, Aliya Abraham, am serving as a scribe at 2:22 PM on 10/10/2021 to document services personally performed by Scooter Dozier MD based on my observations and the provider's statements to me.      Scooter Dozier MD  10/10/21 2045

## 2021-10-10 NOTE — ED TRIAGE NOTES
Pt worried he may have a blood clot or something - no specific changes while breathing - just worried since he had covid in Aug that he may have a  Lot - feeling jose manuel crummy the last 3 days.

## 2021-10-24 ENCOUNTER — HEALTH MAINTENANCE LETTER (OUTPATIENT)
Age: 31
End: 2021-10-24

## 2021-11-29 ENCOUNTER — NURSE TRIAGE (OUTPATIENT)
Dept: NURSING | Facility: CLINIC | Age: 31
End: 2021-11-29

## 2021-11-29 NOTE — TELEPHONE ENCOUNTER
Patient called to report chest pain. Patient then states the spasm he feels is more below his ribs on his upper abdominal area. Patient states he had arm and jaw pain three days ago. Patient states he is an  and could have injuries due to that.     Per protocol patient to be seen within 24 hours.Patient verbalized understanding and agrees to plan of care.    Marley Fermin RN   11/29/21 12:46 AM  [facility] Nurse Advisor    COVID 19 Nurse Triage Plan/Patient Instructions    Please be aware that novel coronavirus (COVID-19) may be circulating in the community. If you develop symptoms such as fever, cough, or SOB or if you have concerns about the presence of another infection including coronavirus (COVID-19), please contact your health care provider or visit [email or web address].     Disposition/Instructions    In-Person Visit with provider recommended. Reference Visit Selection Guide.    Thank you for taking steps to prevent the spread of this virus.  o Limit your contact with others.  o Wear a simple mask to cover your cough.  o Wash your hands well and often.    Resources    [facility]: About COVID-19: [email or web address]    CDC: What to Do If You're Sick: www.cdc.gov/coronavirus/2019-ncov/about/steps-when-sick.html    CDC: Ending Home Isolation: www.cdc.gov/coronavirus/2019-ncov/hcp/disposition-in-home-patients.html     CDC: Caring for Someone: www.cdc.gov/coronavirus/2019-ncov/if-you-are-sick/care-for-someone.html     [facility]: Interim Guidance for Hospital Discharge to Home: www.health.[facility].mn.us/diseases/coronavirus/hcp/hospdischarge.pdf    [facility] clinical trials (COVID-19 research studies): [email or web address]/um-clinical-trials     Below are the COVID-19 hotlines at the [facility] ([facility]). Interpreters are available.   o For health questions: Call 737-381-4383 or 1-540.640.5168 (7 a.m. to 7 p.m.)  o For questions about schools  "and childcare: Call 169-934-0940 or 1-829.335.2093 (7 a.m. to 7 p.m.)                     Reason for Disposition    [1] MODERATE pain (e.g., interferes with normal activities) AND [2] comes and goes (cramps) AND [3] present > 24 hours  (Exception: pain with Vomiting or Diarrhea - see that Guideline)    [1] MILD pain (e.g., does not interfere with normal activities) AND [2] comes and goes (cramps) AND [3] present > 72 hours    Additional Information    Negative: Severe difficulty breathing (e.g., struggling for each breath, speaks in single words)    Negative: Difficult to awaken or acting confused (e.g., disoriented, slurred speech)    Negative: Shock suspected (e.g., cold/pale/clammy skin, too weak to stand, low BP, rapid pulse)    Negative: [1] Chest pain lasts > 5 minutes AND [2] history of heart disease  (i.e., heart attack, bypass surgery, angina, angioplasty, CHF; not just a heart murmur)    Negative: [1] Chest pain lasts > 5 minutes AND [2] described as crushing, pressure-like, or heavy    Negative: [1] Chest pain lasts > 5 minutes AND [2] age > 50    Negative: [1] Chest pain lasts > 5 minutes AND [2] age > 30 AND [3] at least one cardiac risk factor (i.e., hypertension, diabetes, obesity, smoker or strong family history of heart disease)    Negative: [1] Chest pain lasts > 5 minutes AND [2] not relieved with nitroglycerin    Negative: Passed out (i.e., lost consciousness, collapsed and was not responding)    Negative: Heart beating < 50 beats per minute OR > 140 beats per minute    Negative: Visible sweat on face or sweat dripping down face    Negative: Sounds like a life-threatening emergency to the triager    Negative: Followed a chest injury    Negative: SEVERE chest pain    Negative: [1] Intermittent  chest pain or \"angina\" AND [2] increasing in severity or frequency  (Exception: pains lasting a few seconds)    Negative: Pain also present in shoulder(s) or arm(s) or jaw  (Exception: pain is clearly made " "worse by movement)    Negative: Difficulty breathing    Negative: Dizziness or lightheadedness    Negative: Coughing up blood    Negative: History of prior \"blood clot\" in leg or lungs (i.e., deep vein thrombosis, pulmonary embolism)    Negative: Recent illness requiring prolonged bedrest (i.e., immobilization)    Negative: Hip or leg fracture in past 2 months (e.g., had cast on leg or ankle)    Negative: Major surgery in the past month    Negative: Recent long-distance travel with prolonged time in car, bus, plane, or train (i.e., within past 2 weeks; 6 or  more hours duration)    Negative: Taking a deep breath makes pain worse    Negative: Chest pain lasts > 5 minutes (Exceptions: chest pain occurring > 3 days ago and now asymptomatic; same as previously diagnosed heartburn and has accompanying sour taste in mouth)    Negative: Severe difficulty breathing (e.g., struggling for each breath, speaks in single words)    Negative: Shock suspected (e.g., cold/pale/clammy skin, too weak to stand, low BP, rapid pulse)    Negative: Difficult to awaken or acting confused (e.g., disoriented, slurred speech)    Negative: Passed out (i.e., lost consciousness, collapsed and was not responding)    Negative: Visible sweat on face or sweat dripping down face    Negative: Sounds like a life-threatening emergency to the triager    Negative: Followed an abdomen (stomach) injury    Negative: Chest pain    Negative: [1] SEVERE pain (e.g., excruciating) AND [2] present > 1 hour    Negative: [1] Pain lasts > 10 minutes AND [2] age > 50    Negative: [1] Pain lasts > 10 minutes AND [2] age > 40 AND [3] associated chest, arm, neck, upper back or jaw pain    Negative: [1] Pain lasts > 10 minutes AND [2] age > 35 AND [3] at least one cardiac risk factor (i.e., hypertension, diabetes, obesity, smoker or strong family history of heart disease)    Negative: [1] Pain lasts > 10 minutes AND [2] history of heart disease (i.e., heart attack, bypass " "surgery, angina, angioplasty, CHF; not just a heart murmur)    Negative: [1] Pain lasts > 10 minutes AND [2] difficulty breathing    Negative: [1] Vomiting AND [2] contains red blood  (Exception: few streaks and only occurred once)    Negative: [1] Vomiting AND [2] contains black (\"coffee ground\") material    Negative: Blood in bowel movements  (Exception: Blood on surface of BM with constipation)    Negative: Black or tarry bowel movements  (Exception: chronic-unchanged  black-grey bowel movements AND is taking iron pills or Pepto-bismol)    Negative: [1] Pregnant > 24 weeks AND [2] hand or face swelling    Negative: Patient sounds very sick or weak to the triager    Negative: [1] MILD-MODERATE pain AND [2] not relieved by antacids    Negative: [1] MILD-MODERATE pain AND [2] constant AND [3] present > 2 hours    Negative: [1] Vomiting AND [2] contains bile (green color)    Negative: [1] Vomiting AND [2] abdomen looks much more swollen than usual    Negative: White of the eyes have turned yellow (i.e., jaundice)    Negative: Fever > 103 F (39.4 C)    Negative: [1] Fever > 101 F (38.3 C) AND [2] age > 60    Negative: [1] Fever > 100.0 F (37.8 C) AND [2] bedridden (e.g., nursing home patient, CVA, chronic illness, recovering from surgery)    Negative: [1] Fever > 100.0 F (37.8 C) AND [2] diabetes mellitus or weak immune system (e.g., HIV positive, cancer chemo, splenectomy, organ transplant, chronic steroids)    Protocols used: ABDOMINAL PAIN - UPPER-A-AH, CHEST PAIN-A-AH      "

## 2022-09-18 ENCOUNTER — HOSPITAL ENCOUNTER (EMERGENCY)
Facility: CLINIC | Age: 32
Discharge: HOME | End: 2022-09-18
Attending: EMERGENCY MEDICINE | Admitting: EMERGENCY MEDICINE

## 2022-09-18 ENCOUNTER — APPOINTMENT (OUTPATIENT)
Dept: RADIOLOGY | Facility: CLINIC | Age: 32
End: 2022-09-18
Attending: EMERGENCY MEDICINE

## 2022-09-18 VITALS
OXYGEN SATURATION: 100 % | BODY MASS INDEX: 31.24 KG/M2 | TEMPERATURE: 98.1 F | WEIGHT: 270 LBS | HEART RATE: 75 BPM | RESPIRATION RATE: 11 BRPM | HEIGHT: 78 IN | SYSTOLIC BLOOD PRESSURE: 133 MMHG | DIASTOLIC BLOOD PRESSURE: 80 MMHG

## 2022-09-18 DIAGNOSIS — R07.9 CHEST PAIN, UNSPECIFIED TYPE: ICD-10-CM

## 2022-09-18 DIAGNOSIS — K21.9 GASTROESOPHAGEAL REFLUX DISEASE WITHOUT ESOPHAGITIS: Primary | ICD-10-CM

## 2022-09-18 LAB
ALBUMIN SERPL-MCNC: 3.9 G/DL (ref 3.5–5)
ALP SERPL-CCNC: 84 U/L (ref 45–120)
ALT SERPL W P-5'-P-CCNC: 30 U/L (ref 0–45)
ANION GAP SERPL CALCULATED.3IONS-SCNC: 11 MMOL/L (ref 5–18)
AST SERPL W P-5'-P-CCNC: 31 U/L (ref 0–40)
ATRIAL RATE - MUSE: 68 BPM
BASOPHILS # BLD AUTO: 0 10E3/UL (ref 0–0.2)
BASOPHILS NFR BLD AUTO: 1 %
BILIRUB SERPL-MCNC: 0.3 MG/DL (ref 0–1)
BUN SERPL-MCNC: 16 MG/DL (ref 8–22)
CALCIUM SERPL-MCNC: 9.3 MG/DL (ref 8.5–10.5)
CHLORIDE BLD-SCNC: 107 MMOL/L (ref 98–107)
CO2 SERPL-SCNC: 23 MMOL/L (ref 22–31)
CREAT SERPL-MCNC: 1.28 MG/DL (ref 0.7–1.3)
DIASTOLIC BLOOD PRESSURE - MUSE: 85 MMHG
EOSINOPHIL # BLD AUTO: 0.3 10E3/UL (ref 0–0.7)
EOSINOPHIL NFR BLD AUTO: 5 %
ERYTHROCYTE [DISTWIDTH] IN BLOOD BY AUTOMATED COUNT: 12.3 % (ref 10–15)
GFR SERPL CREATININE-BSD FRML MDRD: 77 ML/MIN/1.73M2
GLUCOSE BLD-MCNC: 96 MG/DL (ref 70–125)
HCT VFR BLD AUTO: 42.8 % (ref 40–53)
HGB BLD-MCNC: 14.7 G/DL (ref 13.3–17.7)
HOLD SPECIMEN: NORMAL
HOLD SPECIMEN: NORMAL
IMM GRANULOCYTES # BLD: 0 10E3/UL
IMM GRANULOCYTES NFR BLD: 0 %
INTERPRETATION ECG - MUSE: NORMAL
LIPASE SERPL-CCNC: 31 U/L (ref 0–52)
LYMPHOCYTES # BLD AUTO: 1.8 10E3/UL (ref 0.8–5.3)
LYMPHOCYTES NFR BLD AUTO: 32 %
MCH RBC QN AUTO: 31.9 PG (ref 26.5–33)
MCHC RBC AUTO-ENTMCNC: 34.3 G/DL (ref 31.5–36.5)
MCV RBC AUTO: 93 FL (ref 78–100)
MONOCYTES # BLD AUTO: 0.8 10E3/UL (ref 0–1.3)
MONOCYTES NFR BLD AUTO: 14 %
NEUTROPHILS # BLD AUTO: 2.7 10E3/UL (ref 1.6–8.3)
NEUTROPHILS NFR BLD AUTO: 48 %
NRBC # BLD AUTO: 0 10E3/UL
NRBC BLD AUTO-RTO: 0 /100
P AXIS - MUSE: 32 DEGREES
PLATELET # BLD AUTO: 171 10E3/UL (ref 150–450)
POTASSIUM BLD-SCNC: 3.8 MMOL/L (ref 3.5–5)
PR INTERVAL - MUSE: 180 MS
PROT SERPL-MCNC: 7.5 G/DL (ref 6–8)
QRS DURATION - MUSE: 114 MS
QT - MUSE: 394 MS
QTC - MUSE: 418 MS
R AXIS - MUSE: 73 DEGREES
RBC # BLD AUTO: 4.61 10E6/UL (ref 4.4–5.9)
SODIUM SERPL-SCNC: 141 MMOL/L (ref 136–145)
SYSTOLIC BLOOD PRESSURE - MUSE: 139 MMHG
T AXIS - MUSE: 35 DEGREES
TROPONIN I SERPL-MCNC: 0.01 NG/ML (ref 0–0.29)
VENTRICULAR RATE- MUSE: 68 BPM
WBC # BLD AUTO: 5.7 10E3/UL (ref 4–11)

## 2022-09-18 PROCEDURE — 80053 COMPREHEN METABOLIC PANEL: CPT | Performed by: EMERGENCY MEDICINE

## 2022-09-18 PROCEDURE — 83690 ASSAY OF LIPASE: CPT | Performed by: EMERGENCY MEDICINE

## 2022-09-18 PROCEDURE — 85025 COMPLETE CBC W/AUTO DIFF WBC: CPT | Performed by: EMERGENCY MEDICINE

## 2022-09-18 PROCEDURE — 71046 X-RAY EXAM CHEST 2 VIEWS: CPT

## 2022-09-18 PROCEDURE — 250N000009 HC RX 250: Performed by: EMERGENCY MEDICINE

## 2022-09-18 PROCEDURE — 93005 ELECTROCARDIOGRAM TRACING: CPT | Performed by: EMERGENCY MEDICINE

## 2022-09-18 PROCEDURE — 250N000013 HC RX MED GY IP 250 OP 250 PS 637: Performed by: EMERGENCY MEDICINE

## 2022-09-18 PROCEDURE — 36415 COLL VENOUS BLD VENIPUNCTURE: CPT | Performed by: EMERGENCY MEDICINE

## 2022-09-18 PROCEDURE — 84484 ASSAY OF TROPONIN QUANT: CPT | Performed by: EMERGENCY MEDICINE

## 2022-09-18 PROCEDURE — 99285 EMERGENCY DEPT VISIT HI MDM: CPT | Mod: 25

## 2022-09-18 RX ORDER — FAMOTIDINE 40 MG/1
40 TABLET, FILM COATED ORAL 2 TIMES DAILY
Qty: 60 TABLET | Refills: 0 | Status: SHIPPED | OUTPATIENT
Start: 2022-09-18

## 2022-09-18 RX ADMIN — ALUMINUM HYDROXIDE, MAGNESIUM HYDROXIDE, AND DIMETHICONE 30 ML: 200; 20; 200 SUSPENSION ORAL at 03:46

## 2022-09-18 ASSESSMENT — ENCOUNTER SYMPTOMS
VOMITING: 0
NAUSEA: 0
SHORTNESS OF BREATH: 0

## 2022-09-18 ASSESSMENT — ACTIVITIES OF DAILY LIVING (ADL): ADLS_ACUITY_SCORE: 35

## 2022-09-18 NOTE — ED TRIAGE NOTES
"Pt presents to ED with c/o chest pain starting around midnight tonight.  Pt has hx of anxiety.  Took ASA and went to bed.  Woke from sleep and had pain again in left upper chest.  Denies shortness of breath.  Took another 325 mg ASA 30 minutes PTA.  States pain is better now.  States pain is worse when moving certain ways, states \"it pinches\"     Triage Assessment     Row Name 09/18/22 0323       Triage Assessment (Adult)    Airway WDL WDL       Respiratory WDL    Respiratory WDL WDL       Cardiac WDL    Cardiac WDL X;chest pain       Chest Pain Assessment    Chest Pain Location anterior chest, left    Chest Pain Intervention cardiac monitor placed;12-lead ECG obtained       Peripheral/Neurovascular WDL    Peripheral Neurovascular WDL WDL       Cognitive/Neuro/Behavioral WDL    Cognitive/Neuro/Behavioral WDL WDL              "

## 2022-09-18 NOTE — ED PROVIDER NOTES
EMERGENCY DEPARTMENT ENCOUNTER      NAME: Lui Garcia  AGE: 32 year old male  YOB: 1990  MRN: [id number]  EVALUATION DATE & TIME: 9/18/2022  3:21 AM    PCP: No Ref-Primary, Physician    ED PROVIDER: David Toney M.D.      Chief Complaint   Patient presents with     Chest Pain         FINAL IMPRESSION:  1. Gastroesophageal reflux disease without esophagitis    2. Chest pain, unspecified type          ED COURSE & MEDICAL DECISION MAKING:    Pertinent Labs & Imaging studies reviewed. (See chart for details)  32 year old male presents to the Emergency Department for evaluation of chest pain.  This is over his left side of his chest.  Also worse when he lays flat.  EKG here is normal.  Symptoms been present for over a day.  Did get a troponin this is negative.  Do not think this is cardiac.  LFTs and lipase are normal.  CBC is normal.  No signs of hepatobiliary disease.  I suspect there may be a reflux component.  Did get a chest x-ray.  No pneumonia pneumothorax or other cause of chest pain is noted.  We will put him on Pepcid for his reflux.  He will follow-up with his primary.  Return for worsening symptoms.  Patient negative by PERC criteria.  No indication to test for PE.  No signs of dissection or other serious emergent condition.  I think this is likely anxiety discussed as much with the patient    3:40 AM I met with the patient to gather history and to perform my initial exam. I discussed the plan for care while in the Emergency Department. PPE: Facemask, goggles and gloves     At the conclusion of the encounter I discussed the results of all of the tests and the disposition. The questions were answered. The patient or family acknowledged understanding and was agreeable with the care plan.         MEDICATIONS GIVEN IN THE EMERGENCY:  Medications   lidocaine (viscous) (XYLOCAINE) 2 % 15 mL, alum & mag hydroxide-simethicone (MAALOX) 15 mL GI Cocktail (30 mLs Oral Given 9/18/22 0346)       NEW  PRESCRIPTIONS STARTED AT TODAY'S ER VISIT  Discharge Medication List as of 9/18/2022  4:35 AM      START taking these medications    Details   famotidine (PEPCID) 40 MG tablet Take 1 tablet (40 mg) by mouth 2 times daily, Disp-60 tablet, R-0, Local Print                =================================================================    HPI    Patient information was obtained from: Patient    Use of : N/A         Lui Garcia is a 32 year old male with a pertinent history of generalized anxiety disorder with panic attacks, who presents to this ED by walk in for evaluation of chest pain.    Patient reports having chest pain that started this morning. He took Asprin and the pain went away. Later on in the day his chest pain came back and it was worse in his upper left chest and left shoulder and he described it as sharp pain. 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 Patient also notes that he has been having acid reflux and its worse when laying down. Patient denies shortness of breath, nausea, vomiting, being on medications, smoking cigarettes, drug use, and leg swelling.       REVIEW OF SYSTEMS   Review of Systems   Respiratory: Negative for shortness of breath.    Cardiovascular: Positive for chest pain. Negative for leg swelling.   Gastrointestinal: Negative for nausea and vomiting.   All other systems reviewed and are negative.       PAST MEDICAL HISTORY:  No past medical history on file.    PAST SURGICAL HISTORY:  No past surgical history on file.        CURRENT MEDICATIONS:    No current facility-administered medications for this encounter.     Current Outpatient Medications   Medication     famotidine (PEPCID) 40 MG tablet     clonazePAM (KLONOPIN) 0.5 MG tablet     FLUoxetine (PROZAC) 10 MG tablet         ALLERGIES:  Allergies   Allergen Reactions     Amoxicillin      Penicillins        FAMILY HISTORY:  No family history on file.    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       VITALS:  /80   Pulse 75   Temp 98.1  F (36.7  C) (Oral)   Resp 11   Ht 2.032 m (6' 8\")   Wt 122.5 kg (270 lb)   SpO2 100%   BMI 29.66 kg/m      PHYSICAL EXAM    Physical Exam  Constitutional:       General: He is not in acute distress.     Appearance: He is not diaphoretic.   HENT:      Head: Atraumatic.   Eyes:      General: No scleral icterus.     Pupils: Pupils are equal, round, and reactive to light.   Cardiovascular:      Rate and Rhythm: Normal rate and regular rhythm.      Heart sounds: Normal heart sounds.   Pulmonary:      Effort: No respiratory distress.      Breath sounds: Normal breath sounds.   Abdominal:      General: Bowel sounds are normal.      Palpations: Abdomen is soft.      Tenderness: There is no abdominal tenderness.   Musculoskeletal:         General: No tenderness.   Skin:     General: Skin is warm.      Findings: No rash.           LAB:  All pertinent labs reviewed and interpreted.  Labs Ordered and Resulted from Time of ED Arrival to Time of ED Departure   COMPREHENSIVE METABOLIC PANEL - Normal       Result Value    Sodium 141      Potassium 3.8      Chloride 107      Carbon Dioxide (CO2) 23      Anion Gap 11      Urea Nitrogen 16      Creatinine 1.28      Calcium 9.3      Glucose 96      Alkaline Phosphatase 84      AST 31      ALT 30      Protein Total 7.5      Albumin 3.9      Bilirubin Total 0.3      GFR Estimate 77     LIPASE - Normal    Lipase 31     TROPONIN I - Normal    Troponin I 0.01     CBC WITH PLATELETS AND DIFFERENTIAL    WBC Count 5.7      RBC Count 4.61      Hemoglobin 14.7      Hematocrit 42.8      MCV 93      MCH 31.9      MCHC 34.3      RDW 12.3      Platelet Count 171      % " Neutrophils 48      % Lymphocytes 32      % Monocytes 14      % Eosinophils 5      % Basophils 1      % Immature Granulocytes 0      NRBCs per 100 WBC 0      Absolute Neutrophils 2.7      Absolute Lymphocytes 1.8      Absolute Monocytes 0.8      Absolute Eosinophils 0.3      Absolute Basophils 0.0      Absolute Immature Granulocytes 0.0      Absolute NRBCs 0.0         RADIOLOGY:  Reviewed all pertinent imaging. Please see official radiology report.  XR Chest 2 Views   Final Result   IMPRESSION: Negative chest.          EKG:    Performed at: 329  Impression: Normal EKG.  Unchanged from previous dated October 10, 2021.  Sinus rhythm ventricular to 68.  .  .  QTc 418    I have independently reviewed and interpreted the EKG(s) documented above.    PROCEDURES:         I, Jose Saravia, am serving as a scribe to document services personally performed by Dr. David Toney, based on my observation and the provider's statements to me. I, David Toney MD attest that Jose Saravia is acting in a scribe capacity, has observed my performance of the services and has documented them in accordance with my direction.    David Toney M.D.  Emergency Medicine  [facility] EMERGENCY ROOM  [address] [address] [id number] [phone]  Dept: [phone]       David Toney MD  09/18/22 0533

## 2022-10-15 ENCOUNTER — HEALTH MAINTENANCE LETTER (OUTPATIENT)
Age: 32
End: 2022-10-15

## 2022-12-28 ENCOUNTER — HOSPITAL ENCOUNTER (EMERGENCY)
Facility: CLINIC | Age: 32
Discharge: HOME | End: 2022-12-28
Attending: EMERGENCY MEDICINE | Admitting: EMERGENCY MEDICINE

## 2022-12-28 VITALS
TEMPERATURE: 97.9 F | RESPIRATION RATE: 16 BRPM | SYSTOLIC BLOOD PRESSURE: 121 MMHG | HEART RATE: 60 BPM | OXYGEN SATURATION: 96 % | DIASTOLIC BLOOD PRESSURE: 77 MMHG | WEIGHT: 270 LBS | BODY MASS INDEX: 29.66 KG/M2

## 2022-12-28 DIAGNOSIS — F41.9 ANXIETY: ICD-10-CM

## 2022-12-28 LAB
ATRIAL RATE - MUSE: 71 BPM
DIASTOLIC BLOOD PRESSURE - MUSE: NORMAL MMHG
INTERPRETATION ECG - MUSE: NORMAL
P AXIS - MUSE: 59 DEGREES
PR INTERVAL - MUSE: 190 MS
QRS DURATION - MUSE: 116 MS
QT - MUSE: 388 MS
QTC - MUSE: 421 MS
R AXIS - MUSE: 96 DEGREES
SYSTOLIC BLOOD PRESSURE - MUSE: NORMAL MMHG
T AXIS - MUSE: 68 DEGREES
VENTRICULAR RATE- MUSE: 71 BPM

## 2022-12-28 PROCEDURE — 93005 ELECTROCARDIOGRAM TRACING: CPT | Performed by: EMERGENCY MEDICINE

## 2022-12-28 PROCEDURE — 99283 EMERGENCY DEPT VISIT LOW MDM: CPT

## 2022-12-28 PROCEDURE — 250N000013 HC RX MED GY IP 250 OP 250 PS 637: Performed by: EMERGENCY MEDICINE

## 2022-12-28 RX ORDER — HYDROXYZINE PAMOATE 25 MG/1
25 CAPSULE ORAL 3 TIMES DAILY PRN
Qty: 21 CAPSULE | Refills: 0 | Status: SHIPPED | OUTPATIENT
Start: 2022-12-28

## 2022-12-28 RX ORDER — HYDROXYZINE PAMOATE 25 MG/1
25 CAPSULE ORAL 3 TIMES DAILY PRN
Qty: 21 CAPSULE | Refills: 0 | Status: SHIPPED | OUTPATIENT
Start: 2022-12-28 | End: 2022-12-28

## 2022-12-28 RX ORDER — LORAZEPAM 1 MG/1
1 TABLET ORAL ONCE
Status: COMPLETED | OUTPATIENT
Start: 2022-12-28 | End: 2022-12-28

## 2022-12-28 RX ADMIN — LORAZEPAM 1 MG: 1 TABLET ORAL at 02:04

## 2022-12-28 ASSESSMENT — ENCOUNTER SYMPTOMS
VOMITING: 1
NUMBNESS: 0
NERVOUS/ANXIOUS: 1
NAUSEA: 1
SLEEP DISTURBANCE: 1
BLOOD IN STOOL: 0
FEVER: 0
DIARRHEA: 0
HEADACHES: 1
ROS GI COMMENTS: DENIES HEMATEMESIS.
CHILLS: 0
SORE THROAT: 0
ABDOMINAL PAIN: 1
COUGH: 0

## 2022-12-28 NOTE — DISCHARGE INSTRUCTIONS
As we discussed your history and exam are not suggestive of a heart attack or stroke.  You possibly have some anxiety contributing to your symptoms.  Recommend hydroxyzine 3 times daily as needed for anxiety.  Recommend follow-up with a primary care doctor to help manage your anxiety.  Return to the ER for any worsening symptoms

## 2022-12-28 NOTE — ED PROVIDER NOTES
EMERGENCY DEPARTMENT ENCOUNTER      NAME: Lui Garcia  AGE: 32 year old male  YOB: 1990  MRN: [id number]  EVALUATION DATE & TIME: 2022  1:37 AM    PCP: No Ref-Primary, Physician    ED PROVIDER: Ethan Lucero MD        Chief Complaint   Patient presents with     Anxiety         FINAL IMPRESSION:  1. Anxiety          ED COURSE & MEDICAL DECISION MAKING:    Pertinent Labs & Imaging studies reviewed. (See chart for details)  32 year old male presents to the Emergency Department for evaluation of concern for developing a stroke or heart attack.  Patient admits has pretty severe anxiety.  He has been on Klonopin in the past.  States a family member but not related by blood recently  from heart attack.  Patient states he has been having a hard time falling asleeping the last couple nights.  Patient states he is feeling anxious and has been having some epigastric abdominal discomfort from time to time. Has been previously diagnosed with GERD.     On exam he is mildly anxious.  Per chart review had cardiac lab testing done in September.  Patient's declining any lab testing tonight.  Patient is also questioning if an EKG is indicated.  I did recommend an EKG since he is worried about heart disease.  EKG performed and shows sinus rhythm similar to this past fall    Patient does not want any blood test done which seems reasonable since he had blood test done in September without any explanation for symptoms.  No abdominal tenderness or guarding to suggest cholecystitis    Per chart review has been seen through the [facility] for anxiety and panic attacks.    NIH 0.  Stroke unlikely based on history and exam.     Symptoms most consistent with generalized anxiety disorder       Plan for oral lorazepam x1 for anxiety.  Rechecked and feeling better      Will discharge home with hydroxyzine for anxiety. I recommend follow-up with primary care doctor for further management of his anxiety.  Also has been seen  in primary care clinic in [place] for anxiety.              1:41 AM I met with the patient, obtained history, performed an initial exam, and discussed options and plan for diagnostics and treatment here in the ED. PPE worn including N95 mask, surgical gloves, eye protection.  2:26 AM I reevaluated and updated the patient on results. Patient is feeling improved following medications in the ED.. Discussed plans for discharge and patient is amenable.    Medical Decision Making    History:    Supplemental history from: Documented in [facility], if applicable    External Record(s) reviewed: Outpatient Record: Reviewed records from outpatient and [place] ER visits    Work Up:    Chart documentation includes differential considered and any EKGs or imaging independently interpreted by provider.    In additional to work up documented, I considered the following work up: See chart documentation, if applicable.    External consultation:    Discussion of management with another provider: See chart documentation, if applicable    Complicating factors:    Care impacted by chronic illness: Mental Health    Care affected by social determinants of health: N/A    Disposition considerations: Discharge. I prescribed additional prescription strength medication(s) as charted. N/A.            At the conclusion of the encounter I discussed the results of all of the tests and the disposition. The questions were answered. The patient or family acknowledged understanding and was agreeable with the care plan.           MEDICATIONS GIVEN IN THE EMERGENCY:  Medications   LORazepam (ATIVAN) tablet 1 mg (1 mg Oral Given 12/28/22 0204)       NEW PRESCRIPTIONS STARTED AT TODAY'S ER VISIT  Current Discharge Medication List      START taking these medications    Details   hydrOXYzine (VISTARIL) 25 MG capsule Take 1 capsule (25 mg) by mouth 3 times daily as needed for anxiety  Qty: 21 capsule, Refills: 0             "    =================================================================    HPI    Triage note  \"  Pt states is feeling anxiety, indigestion, and nervousness. Denies sob.     \"      Patient information was obtained from: Patient    Use of : N/A        Lui Garcia is a 32 year old male with a pertinent history of anxiety who presents to this ED by walk in for evaluation of anxiety. Patient reports that over the past couple of days he has been having symptoms of malaise, indigestion, and anxiety after eating meal at night. Patient has had a total of 3 episodes since time of onset. Patient states he had an episode of emesis. Patient did take TUMS for his symptoms with improvement. No hematemesis or black or bloody stools. No reported history of gallbladder issues.    Patient reports he was having a \"weird sensation\" to his left ankle and was worried this could be a sign of a stroke. Patient has also been having a pressure headache to his bilateral temples which has exacerbated his concern for stroke. Additionally, he has been worried he is having a heart attack. He is currently denying any labs, but reported to the ED as he would like to rule out stroke. Patient has been having sleeplessness for the past couple of nights and is taking melatonin for this. Patient is otherwise healthy and has not other concerns at this time.    SHx- Denies tobacco or vape use. Denies anabolic steroid use. Patient is currently a professional mixed martial artist.    REVIEW OF SYSTEMS   Review of Systems   Constitutional: Negative for chills and fever.        Positive for malaise.   HENT: Negative for sore throat.    Respiratory: Negative for cough.    Cardiovascular: Negative for chest pain.   Gastrointestinal: Positive for abdominal pain, nausea and vomiting. Negative for blood in stool and diarrhea.        Denies hematemesis.   Genitourinary: Negative.    Neurological: Positive for headaches (\"pressure\"). Negative for " numbness.        Denies paraesthesias.   Psychiatric/Behavioral: Positive for sleep disturbance. The patient is nervous/anxious.        PAST MEDICAL HISTORY:  History reviewed. No pertinent past medical history.    PAST SURGICAL HISTORY:  History reviewed. No pertinent surgical history.        CURRENT MEDICATIONS:    hydrOXYzine (VISTARIL) 25 MG capsule  clonazePAM (KLONOPIN) 0.5 MG tablet  famotidine (PEPCID) 40 MG tablet  FLUoxetine (PROZAC) 10 MG tablet        ALLERGIES:  Allergies   Allergen Reactions     Amoxicillin      Penicillins        FAMILY HISTORY:  History reviewed. No pertinent family history.    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       VITALS:  BP (!) 142/92   Pulse 76   Temp 97.9  F (36.6  C) (Oral)   Resp 16   Wt 122.5 kg (270 lb)   SpO2 100%   BMI 29.66 kg/m      PHYSICAL EXAM      Vitals: BP (!) 142/92   Pulse 76   Temp 97.9  F (36.6  C) (Oral)   Resp 16   Wt 122.5 kg (270 lb)   SpO2 100%   BMI 29.66 kg/m    General: Appears in no acute distress, awake, alert, interactive.  Eyes: Conjunctivae non-injected. Sclera anicteric.  HENT: Atraumatic.  Neck: Supple.  Respiratory/Chest: Respiration unlabored.  Heart: Regular rate and rhythm.  Abdomen: non distended, non tender  Musculoskeletal: Normal extremities. No edema or erythema. 2+ dorsal pedis pulses. No calf swelling  Skin: Normal color. No rash or diaphoresis.  Neurologic: Face symmetric, moves all extremities spontaneously. Speech clear.  NIH 0  Psychiatric: Oriented to person, place, and time. Affect appropriate.  Slightly anxious       LAB:  All pertinent labs reviewed and interpreted.       RADIOLOGY:  Reviewed all pertinent imaging. Please see official radiology  report.  No orders to display       EKG:    Performed at: 28-DEC-2022 01:59    Impression: Sinus rhythm. Rightward axis    Rate: 71 bpm  Rhythm: Sinus  Axis: 96  NC Interval: 190 ms  QRS Interval: 116 ms  QTc Interval: 421 ms  ST Changes: None  Comparison: When compared with EKG of 18-SEP-2022, No significant change was found    I have independently reviewed and interpreted the EKG(s) documented above.    PROCEDURES:   None      I, Ric Mancuso, am serving as a scribe to document services personally performed by Vance Lucero MD based on my observation and the provider's statements to me. I, Dr. Vance Lucero, attest that Ric Mancuso is acting in a scribe capacity, has observed my performance of the services and has documented them in accordance with my direction.    Vance Lucero MD  Emergency Medicine  [facility] EMERGENCY ROOM  [facility], [place] [address]  [phone]     Vance Lucero MD  12/28/22 0236

## 2023-01-26 ENCOUNTER — HOSPITAL ENCOUNTER (EMERGENCY)
Facility: CLINIC | Age: 33
Discharge: HOME | End: 2023-01-26
Attending: EMERGENCY MEDICINE | Admitting: EMERGENCY MEDICINE

## 2023-01-26 VITALS
HEIGHT: 78 IN | HEART RATE: 59 BPM | DIASTOLIC BLOOD PRESSURE: 66 MMHG | RESPIRATION RATE: 22 BRPM | TEMPERATURE: 98.3 F | OXYGEN SATURATION: 99 % | WEIGHT: 260 LBS | BODY MASS INDEX: 30.08 KG/M2 | SYSTOLIC BLOOD PRESSURE: 123 MMHG

## 2023-01-26 DIAGNOSIS — R07.9 CHEST PAIN, UNSPECIFIED TYPE: ICD-10-CM

## 2023-01-26 DIAGNOSIS — F41.9 ANXIETY: ICD-10-CM

## 2023-01-26 LAB
ALBUMIN SERPL-MCNC: 4.4 G/DL (ref 3.5–5)
ALBUMIN UR-MCNC: NEGATIVE MG/DL
ALP SERPL-CCNC: 55 U/L (ref 45–120)
ALT SERPL W P-5'-P-CCNC: 20 U/L (ref 0–45)
AMORPH CRY #/AREA URNS HPF: ABNORMAL /HPF
ANION GAP SERPL CALCULATED.3IONS-SCNC: 7 MMOL/L (ref 5–18)
APPEARANCE UR: CLEAR
AST SERPL W P-5'-P-CCNC: 21 U/L (ref 0–40)
ATRIAL RATE - MUSE: 70 BPM
BASOPHILS # BLD AUTO: 0 10E3/UL (ref 0–0.2)
BASOPHILS NFR BLD AUTO: 1 %
BILIRUB SERPL-MCNC: 0.5 MG/DL (ref 0–1)
BILIRUB UR QL STRIP: NEGATIVE
BUN SERPL-MCNC: 16 MG/DL (ref 8–22)
CALCIUM SERPL-MCNC: 9.5 MG/DL (ref 8.5–10.5)
CHLORIDE BLD-SCNC: 106 MMOL/L (ref 98–107)
CO2 SERPL-SCNC: 29 MMOL/L (ref 22–31)
COLOR UR AUTO: ABNORMAL
CREAT SERPL-MCNC: 1.26 MG/DL (ref 0.7–1.3)
DIASTOLIC BLOOD PRESSURE - MUSE: 87 MMHG
EOSINOPHIL # BLD AUTO: 0.3 10E3/UL (ref 0–0.7)
EOSINOPHIL NFR BLD AUTO: 6 %
ERYTHROCYTE [DISTWIDTH] IN BLOOD BY AUTOMATED COUNT: 11.9 % (ref 10–15)
GFR SERPL CREATININE-BSD FRML MDRD: 78 ML/MIN/1.73M2
GLUCOSE BLD-MCNC: 95 MG/DL (ref 70–125)
GLUCOSE UR STRIP-MCNC: NEGATIVE MG/DL
HCT VFR BLD AUTO: 43.7 % (ref 40–53)
HGB BLD-MCNC: 15 G/DL (ref 13.3–17.7)
HGB UR QL STRIP: NEGATIVE
HOLD SPECIMEN: NORMAL
IMM GRANULOCYTES # BLD: 0 10E3/UL
IMM GRANULOCYTES NFR BLD: 0 %
INTERPRETATION ECG - MUSE: NORMAL
KETONES UR STRIP-MCNC: NEGATIVE MG/DL
LEUKOCYTE ESTERASE UR QL STRIP: NEGATIVE
LIPASE SERPL-CCNC: 34 U/L (ref 0–52)
LYMPHOCYTES # BLD AUTO: 2 10E3/UL (ref 0.8–5.3)
LYMPHOCYTES NFR BLD AUTO: 40 %
MCH RBC QN AUTO: 31.3 PG (ref 26.5–33)
MCHC RBC AUTO-ENTMCNC: 34.3 G/DL (ref 31.5–36.5)
MCV RBC AUTO: 91 FL (ref 78–100)
MONOCYTES # BLD AUTO: 0.6 10E3/UL (ref 0–1.3)
MONOCYTES NFR BLD AUTO: 11 %
MUCOUS THREADS #/AREA URNS LPF: PRESENT /LPF
NEUTROPHILS # BLD AUTO: 2.2 10E3/UL (ref 1.6–8.3)
NEUTROPHILS NFR BLD AUTO: 42 %
NITRATE UR QL: NEGATIVE
NRBC # BLD AUTO: 0 10E3/UL
NRBC BLD AUTO-RTO: 0 /100
P AXIS - MUSE: 50 DEGREES
PH UR STRIP: 6.5 [PH] (ref 5–7)
PLATELET # BLD AUTO: 176 10E3/UL (ref 150–450)
POTASSIUM BLD-SCNC: 3.5 MMOL/L (ref 3.5–5)
PR INTERVAL - MUSE: 190 MS
PROT SERPL-MCNC: 7.6 G/DL (ref 6–8)
QRS DURATION - MUSE: 116 MS
QT - MUSE: 398 MS
QTC - MUSE: 429 MS
R AXIS - MUSE: 79 DEGREES
RBC # BLD AUTO: 4.79 10E6/UL (ref 4.4–5.9)
RBC URINE: 6 /HPF
SODIUM SERPL-SCNC: 142 MMOL/L (ref 136–145)
SP GR UR STRIP: 1.03 (ref 1–1.03)
SYSTOLIC BLOOD PRESSURE - MUSE: 147 MMHG
T AXIS - MUSE: 55 DEGREES
TROPONIN I SERPL-MCNC: <0.01 NG/ML (ref 0–0.29)
TSH SERPL DL<=0.005 MIU/L-ACNC: 2.25 UIU/ML (ref 0.3–5)
UROBILINOGEN UR STRIP-MCNC: <2 MG/DL
VENTRICULAR RATE- MUSE: 70 BPM
WBC # BLD AUTO: 5.1 10E3/UL (ref 4–11)
WBC URINE: 0 /HPF

## 2023-01-26 PROCEDURE — 84484 ASSAY OF TROPONIN QUANT: CPT | Performed by: EMERGENCY MEDICINE

## 2023-01-26 PROCEDURE — 250N000011 HC RX IP 250 OP 636: Performed by: EMERGENCY MEDICINE

## 2023-01-26 PROCEDURE — 93005 ELECTROCARDIOGRAM TRACING: CPT | Performed by: EMERGENCY MEDICINE

## 2023-01-26 PROCEDURE — 81001 URINALYSIS AUTO W/SCOPE: CPT | Performed by: EMERGENCY MEDICINE

## 2023-01-26 PROCEDURE — 36415 COLL VENOUS BLD VENIPUNCTURE: CPT | Performed by: EMERGENCY MEDICINE

## 2023-01-26 PROCEDURE — 83690 ASSAY OF LIPASE: CPT | Performed by: EMERGENCY MEDICINE

## 2023-01-26 PROCEDURE — 85004 AUTOMATED DIFF WBC COUNT: CPT | Performed by: EMERGENCY MEDICINE

## 2023-01-26 PROCEDURE — 82374 ASSAY BLOOD CARBON DIOXIDE: CPT | Performed by: EMERGENCY MEDICINE

## 2023-01-26 PROCEDURE — 84443 ASSAY THYROID STIM HORMONE: CPT | Performed by: EMERGENCY MEDICINE

## 2023-01-26 PROCEDURE — 96360 HYDRATION IV INFUSION INIT: CPT

## 2023-01-26 PROCEDURE — 258N000003 HC RX IP 258 OP 636: Performed by: EMERGENCY MEDICINE

## 2023-01-26 PROCEDURE — 99284 EMERGENCY DEPT VISIT MOD MDM: CPT | Mod: 25

## 2023-01-26 RX ORDER — SODIUM CHLORIDE 9 MG/ML
INJECTION, SOLUTION INTRAVENOUS CONTINUOUS
Status: DISCONTINUED | OUTPATIENT
Start: 2023-01-26 | End: 2023-01-26 | Stop reason: HOSPADM

## 2023-01-26 RX ADMIN — SODIUM CHLORIDE 1000 ML: 9 INJECTION, SOLUTION INTRAVENOUS at 03:27

## 2023-01-26 ASSESSMENT — ENCOUNTER SYMPTOMS
NAUSEA: 1
NERVOUS/ANXIOUS: 1
VOMITING: 0
SHORTNESS OF BREATH: 1
ABDOMINAL PAIN: 1
FLANK PAIN: 1
APPETITE CHANGE: 1

## 2023-01-26 ASSESSMENT — ACTIVITIES OF DAILY LIVING (ADL): ADLS_ACUITY_SCORE: 35

## 2023-01-26 NOTE — ED PROVIDER NOTES
EMERGENCY DEPARTMENT ENCOUNTER      NAME: Lui Garcia  AGE: 32 year old male  YOB: 1990  MRN: [id number]  EVALUATION DATE & TIME: 1/26/2023  2:56 AM    PCP: No Ref-Primary, Physician    ED PROVIDER: David Toney M.D.      Chief Complaint   Patient presents with     Chest Pain     Abdominal Pain         FINAL IMPRESSION:  1. Chest pain, unspecified type    2. Anxiety          ED COURSE & MEDICAL DECISION MAKING:    Pertinent Labs & Imaging studies reviewed. (See chart for details)  32 year old male presents to the Emergency Department for evaluation of chest pain flank pain and anxiety.  I think most of his symptoms are due to his anxiety.  Has a long history of this.  EKG done here does not show any ischemic changes.  It is similar to his previous.  Troponins negative.  I would not pursue further work-up for ACS.  He is negative by PERC criteria.  No indication for D-dimer or CT scan.  Patient's thyroid is normal.  Urine shows a few red cells.  I will think this is kidney stone.  No significant pain at this time.  Patient feeling better.  Will discharge home.  We will have him follow-up with his primary.  Return for worsening symptoms    3:07 AM I met with the patient to gather history and to perform my initial exam. I discussed the plan for care while in the Emergency Department. PPE: Surgical Mask  3:37 AM Nurse reports that the patient declined Pepcid because they took some prior to arrival     At the conclusion of the encounter I discussed the results of all of the tests and the disposition. The questions were answered. The patient or family acknowledged understanding and was agreeable with the care plan.     Medical Decision Making    History:    Supplemental history from: Documented in chart, if applicable    External Record(s) reviewed: Documented in chart, if applicable.    Work Up:    Chart documentation includes differential considered and any EKGs or imaging independently interpreted  "by provider, where specified.    In additional to work up documented, I considered the following work up: Documented in chart, if applicable.    External consultation:    Discussion of management with another provider: Documented in chart, if applicable    Complicating factors:    Care impacted by chronic illness: Mental Health    Care affected by social determinants of health: N/A    Disposition considerations: Discharge. No recommendations on prescription strength medication(s). N/A.          MEDICATIONS GIVEN IN THE EMERGENCY:  Medications   0.9% sodium chloride BOLUS (1,000 mLs Intravenous New Bag 1/26/23 0327)     Followed by   sodium chloride 0.9% infusion (has no administration in time range)   famotidine (PEPCID) injection 20 mg (20 mg Intravenous Not Given 1/26/23 0329)       NEW PRESCRIPTIONS STARTED AT TODAY'S ER VISIT  New Prescriptions    No medications on file          =================================================================    HPI    Patient information was obtained from: Patient     Use of : N/A       Lui Garcia is a 32 year old male with a pertinent history of anxiety with panic attacks who presents to this ED via walk-in for evaluation of abdominal pain and anxiety     Per chart review: The patient presented to this ED on 12/28/22 for worries of stroke or heart attack. The patient had an EKG which was normal. The patient did not want any blood testing to be performed. The patient reported a history of anxiety and was discharged in stable condition.     The patient reports left sided abdominal pain and flank pain on and off for the \"last 3 weeks\" and some chest pain that concerned them so they came to the ED. The patient notes they \"haven't eaten much at all\" due to the abdominal pain. The patient reports they have \"been having a lot of anxiety\" and thinks this could be anxiety related. They took benadryl tonight without relief. The patient also reports some nausea. The " "patient wants to make sure they are not having a heart attack and wants to get their thyroid checked. The patient denies vomiting or shortness of breath.     REVIEW OF SYSTEMS   Review of Systems   Constitutional: Positive for appetite change (decreased).   Respiratory: Positive for shortness of breath.    Cardiovascular: Positive for chest pain.   Gastrointestinal: Positive for abdominal pain (left side) and nausea. Negative for vomiting.   Genitourinary: Positive for flank pain (left).   Psychiatric/Behavioral: The patient is nervous/anxious.    All other systems reviewed and are negative.       PAST MEDICAL HISTORY:  No past medical history on file.    PAST SURGICAL HISTORY:  No past surgical history on file.        CURRENT MEDICATIONS:    Current Facility-Administered Medications   Medication     0.9% sodium chloride BOLUS    Followed by     sodium chloride 0.9% infusion     Current Outpatient Medications   Medication     clonazePAM (KLONOPIN) 0.5 MG tablet     famotidine (PEPCID) 40 MG tablet     FLUoxetine (PROZAC) 10 MG tablet     hydrOXYzine (VISTARIL) 25 MG capsule         ALLERGIES:  Allergies   Allergen Reactions     Amoxicillin      Penicillins        FAMILY HISTORY:  No family history on file.    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       VITALS:  BP (!) 147/87   Pulse 62   Temp 98.3  F (36.8  C) (Oral)   Resp 11   Ht 2.032 m (6' 8\")   Wt 117.9 kg (260 lb)   SpO2 98%   BMI 28.56 kg/m      PHYSICAL EXAM    Physical Exam  Vitals and nursing note reviewed.   Constitutional:       General: He is not in acute distress.     Appearance: He is not diaphoretic.   HENT:      Head: Atraumatic.   Eyes:      General: No scleral icterus.     Pupils: " Pupils are equal, round, and reactive to light.   Cardiovascular:      Rate and Rhythm: Normal rate and regular rhythm.      Heart sounds: Normal heart sounds.   Pulmonary:      Effort: No respiratory distress.      Breath sounds: Normal breath sounds.   Abdominal:      Palpations: Abdomen is soft.      Tenderness: There is no abdominal tenderness.   Musculoskeletal:         General: No tenderness.   Lymphadenopathy:      Cervical: No cervical adenopathy.   Skin:     General: Skin is warm.      Findings: No rash.           LAB:  All pertinent labs reviewed and interpreted.  Labs Ordered and Resulted from Time of ED Arrival to Time of ED Departure   ROUTINE UA WITH MICROSCOPIC REFLEX TO CULTURE - Abnormal       Result Value    Color Urine Light Yellow      Appearance Urine Clear      Glucose Urine Negative      Bilirubin Urine Negative      Ketones Urine Negative      Specific Gravity Urine 1.032 (*)     Blood Urine Negative      pH Urine 6.5      Protein Albumin Urine Negative      Urobilinogen Urine <2.0      Nitrite Urine Negative      Leukocyte Esterase Urine Negative      Mucus Urine Present (*)     Amorphous Crystals Urine Few (*)     RBC Urine 6 (*)     WBC Urine 0     COMPREHENSIVE METABOLIC PANEL - Normal    Sodium 142      Potassium 3.5      Chloride 106      Carbon Dioxide (CO2) 29      Anion Gap 7      Urea Nitrogen 16      Creatinine 1.26      Calcium 9.5      Glucose 95      Alkaline Phosphatase 55      AST 21      ALT 20      Protein Total 7.6      Albumin 4.4      Bilirubin Total 0.5      GFR Estimate 78     LIPASE - Normal    Lipase 34     TROPONIN I - Normal    Troponin I <0.01     TSH WITH FREE T4 REFLEX - Normal    TSH 2.25     CBC WITH PLATELETS AND DIFFERENTIAL    WBC Count 5.1      RBC Count 4.79      Hemoglobin 15.0      Hematocrit 43.7      MCV 91      MCH 31.3      MCHC 34.3      RDW 11.9      Platelet Count 176      % Neutrophils 42      % Lymphocytes 40      % Monocytes 11      %  Eosinophils 6      % Basophils 1      % Immature Granulocytes 0      NRBCs per 100 WBC 0      Absolute Neutrophils 2.2      Absolute Lymphocytes 2.0      Absolute Monocytes 0.6      Absolute Eosinophils 0.3      Absolute Basophils 0.0      Absolute Immature Granulocytes 0.0      Absolute NRBCs 0.0         RADIOLOGY:  Reviewed all pertinent imaging. Please see official radiology report.  No orders to display       EKG:    Performed at: 300  Impression: Normal EKG.  Unchanged from previous dated December 28, 2022  Normal sinus rhythm with ventricular to 70.  Parable 190.  .  QTc 429    I have independently reviewed and interpreted the EKG(s) documented above.      I, Sindhu Rodriguez, am serving as a scribe to document services personally performed by Dr. David Toney, based on my observation and the provider's statements to me. I, David Toney MD attest that Sindhu Rodriguez is acting in a scribe capacity, has observed my performance of the services and has documented them in accordance with my direction.    David Toney M.D.  Emergency Medicine  [facility] EMERGENCY ROOM  [address] [id number] [phone]  Dept: [phone]     David Toney MD  01/26/23 0415

## 2023-01-26 NOTE — ED TRIAGE NOTES
Pt arrives with onset of chest pain approx 2 hrs PTA. Pain is reported as a pressured discomfort on the left side near the ribs. Denies any SOB at this time. Pt reports anxiety. ABC's intact.      Triage Assessment     Row Name 01/26/23 0301       Triage Assessment (Adult)    Airway WDL WDL       Respiratory WDL    Respiratory WDL WDL       Skin Circulation/Temperature WDL    Skin Circulation/Temperature WDL WDL       Cardiac WDL    Cardiac WDL X;chest pain       Chest Pain Assessment    Chest Pain Location anterior chest, left    Duration 0100 today    Associated Signs/Symptoms anxiety    Chest Pain Intervention cardiac biomarkers drawn;cardiac monitoring continued;cardiac monitor placed;12-lead ECG obtained;activity minimized       Peripheral/Neurovascular WDL    Peripheral Neurovascular WDL WDL       Cognitive/Neuro/Behavioral WDL    Cognitive/Neuro/Behavioral WDL WDL

## 2023-02-18 ENCOUNTER — HOSPITAL ENCOUNTER (EMERGENCY)
Facility: CLINIC | Age: 33
Discharge: HOME | End: 2023-02-18
Attending: EMERGENCY MEDICINE | Admitting: EMERGENCY MEDICINE

## 2023-02-18 VITALS
OXYGEN SATURATION: 99 % | HEIGHT: 78 IN | SYSTOLIC BLOOD PRESSURE: 147 MMHG | WEIGHT: 263.8 LBS | BODY MASS INDEX: 30.52 KG/M2 | TEMPERATURE: 97.7 F | RESPIRATION RATE: 18 BRPM | DIASTOLIC BLOOD PRESSURE: 92 MMHG | HEART RATE: 74 BPM

## 2023-02-18 DIAGNOSIS — K29.00 ACUTE GASTRITIS WITHOUT HEMORRHAGE, UNSPECIFIED GASTRITIS TYPE: ICD-10-CM

## 2023-02-18 LAB
ALBUMIN SERPL-MCNC: 4.3 G/DL (ref 3.5–5)
ALBUMIN UR-MCNC: NEGATIVE MG/DL
ALP SERPL-CCNC: 55 U/L (ref 45–120)
ALT SERPL W P-5'-P-CCNC: 16 U/L (ref 0–45)
ANION GAP SERPL CALCULATED.3IONS-SCNC: 11 MMOL/L (ref 5–18)
APPEARANCE UR: CLEAR
AST SERPL W P-5'-P-CCNC: 15 U/L (ref 0–40)
BASOPHILS # BLD AUTO: 0.1 10E3/UL (ref 0–0.2)
BASOPHILS NFR BLD AUTO: 1 %
BILIRUB SERPL-MCNC: 0.5 MG/DL (ref 0–1)
BILIRUB UR QL STRIP: NEGATIVE
BUN SERPL-MCNC: 16 MG/DL (ref 8–22)
CALCIUM SERPL-MCNC: 9.5 MG/DL (ref 8.5–10.5)
CHLORIDE BLD-SCNC: 104 MMOL/L (ref 98–107)
CO2 SERPL-SCNC: 26 MMOL/L (ref 22–31)
COLOR UR AUTO: NORMAL
CREAT SERPL-MCNC: 1.03 MG/DL (ref 0.7–1.3)
EOSINOPHIL # BLD AUTO: 0.3 10E3/UL (ref 0–0.7)
EOSINOPHIL NFR BLD AUTO: 6 %
ERYTHROCYTE [DISTWIDTH] IN BLOOD BY AUTOMATED COUNT: 11.8 % (ref 10–15)
GFR SERPL CREATININE-BSD FRML MDRD: >90 ML/MIN/1.73M2
GLUCOSE BLD-MCNC: 96 MG/DL (ref 70–125)
GLUCOSE UR STRIP-MCNC: NEGATIVE MG/DL
HCT VFR BLD AUTO: 44.2 % (ref 40–53)
HGB BLD-MCNC: 15.4 G/DL (ref 13.3–17.7)
HGB UR QL STRIP: NEGATIVE
HYALINE CASTS: 1 /LPF
IMM GRANULOCYTES # BLD: 0 10E3/UL
IMM GRANULOCYTES NFR BLD: 0 %
KETONES UR STRIP-MCNC: NEGATIVE MG/DL
LEUKOCYTE ESTERASE UR QL STRIP: NEGATIVE
LIPASE SERPL-CCNC: 30 U/L (ref 0–52)
LYMPHOCYTES # BLD AUTO: 1.5 10E3/UL (ref 0.8–5.3)
LYMPHOCYTES NFR BLD AUTO: 28 %
MCH RBC QN AUTO: 31.3 PG (ref 26.5–33)
MCHC RBC AUTO-ENTMCNC: 34.8 G/DL (ref 31.5–36.5)
MCV RBC AUTO: 90 FL (ref 78–100)
MONOCYTES # BLD AUTO: 0.5 10E3/UL (ref 0–1.3)
MONOCYTES NFR BLD AUTO: 10 %
NEUTROPHILS # BLD AUTO: 3 10E3/UL (ref 1.6–8.3)
NEUTROPHILS NFR BLD AUTO: 55 %
NITRATE UR QL: NEGATIVE
NRBC # BLD AUTO: 0 10E3/UL
NRBC BLD AUTO-RTO: 0 /100
PH UR STRIP: 6.5 [PH] (ref 5–7)
PLATELET # BLD AUTO: 176 10E3/UL (ref 150–450)
POTASSIUM BLD-SCNC: 3.7 MMOL/L (ref 3.5–5)
PROT SERPL-MCNC: 7.5 G/DL (ref 6–8)
RBC # BLD AUTO: 4.92 10E6/UL (ref 4.4–5.9)
RBC URINE: <1 /HPF
SODIUM SERPL-SCNC: 141 MMOL/L (ref 136–145)
SP GR UR STRIP: 1.02 (ref 1–1.03)
SQUAMOUS EPITHELIAL: <1 /HPF
UROBILINOGEN UR STRIP-MCNC: <2 MG/DL
WBC # BLD AUTO: 5.5 10E3/UL (ref 4–11)
WBC URINE: <1 /HPF

## 2023-02-18 PROCEDURE — 81001 URINALYSIS AUTO W/SCOPE: CPT | Performed by: EMERGENCY MEDICINE

## 2023-02-18 PROCEDURE — 250N000013 HC RX MED GY IP 250 OP 250 PS 637: Performed by: EMERGENCY MEDICINE

## 2023-02-18 PROCEDURE — 36415 COLL VENOUS BLD VENIPUNCTURE: CPT | Performed by: EMERGENCY MEDICINE

## 2023-02-18 PROCEDURE — 99284 EMERGENCY DEPT VISIT MOD MDM: CPT

## 2023-02-18 PROCEDURE — 83690 ASSAY OF LIPASE: CPT | Performed by: EMERGENCY MEDICINE

## 2023-02-18 PROCEDURE — 80053 COMPREHEN METABOLIC PANEL: CPT | Performed by: EMERGENCY MEDICINE

## 2023-02-18 PROCEDURE — 85025 COMPLETE CBC W/AUTO DIFF WBC: CPT | Performed by: EMERGENCY MEDICINE

## 2023-02-18 RX ORDER — DICYCLOMINE HYDROCHLORIDE 10 MG/1
20 CAPSULE ORAL ONCE
Status: COMPLETED | OUTPATIENT
Start: 2023-02-18 | End: 2023-02-18

## 2023-02-18 RX ORDER — SUCRALFATE 1 G/1
1 TABLET ORAL 3 TIMES DAILY
Qty: 90 TABLET | Refills: 0 | Status: SHIPPED | OUTPATIENT
Start: 2023-02-18 | End: 2023-03-20

## 2023-02-18 RX ORDER — DICYCLOMINE HCL 20 MG
20 TABLET ORAL 2 TIMES DAILY PRN
Qty: 20 TABLET | Refills: 0 | Status: SHIPPED | OUTPATIENT
Start: 2023-02-18 | End: 2023-02-28

## 2023-02-18 RX ORDER — SUCRALFATE 1 G/1
1 TABLET ORAL ONCE
Status: COMPLETED | OUTPATIENT
Start: 2023-02-18 | End: 2023-02-18

## 2023-02-18 RX ADMIN — SUCRALFATE 1 G: 1 TABLET ORAL at 03:18

## 2023-02-18 RX ADMIN — DICYCLOMINE HYDROCHLORIDE 20 MG: 10 CAPSULE ORAL at 03:18

## 2023-02-18 ASSESSMENT — ENCOUNTER SYMPTOMS
NAUSEA: 0
FLANK PAIN: 1
SHORTNESS OF BREATH: 0
HEMATURIA: 0
DIARRHEA: 1
DYSURIA: 0
ABDOMINAL PAIN: 1
FEVER: 0
VOMITING: 0
CHILLS: 0
CONSTIPATION: 1

## 2023-02-18 ASSESSMENT — ACTIVITIES OF DAILY LIVING (ADL): ADLS_ACUITY_SCORE: 35

## 2023-02-18 NOTE — ED NOTES
Presents to this department c/o LUQ abdominal pain x 3 weeks. Also reports feeling constipated. Last bowel movement was one hour prior to arrival. Mother at bedside and offers support to patient. Pain, 6/10. No observed distress. Anticipate discharge. Continue to monitor and assist as needed. Offered support and education.

## 2023-02-18 NOTE — ED PROVIDER NOTES
EMERGENCY DEPARTMENT ENCOUNTER      NAME: Lui Garcia  AGE: 32 year old male  YOB: 1990  MRN: [id number]  EVALUATION DATE & TIME: 2/18/2023  2:35 AM    PCP: No Ref-Primary, Physician    ED PROVIDER: Ally Enciso M.D.      Chief Complaint   Patient presents with     Abdominal Pain     Flank Pain         FINAL IMPRESSION:  1. Acute gastritis without hemorrhage, unspecified gastritis type        MEDICAL DECISION MAKING:    Pertinent Labs & Imaging studies reviewed. (See chart for details)  ED Course as of 02/18/23 0340   Sat Feb 18, 2023   0255 Afebrile.  Vital signs here with some mild hypertension, otherwise unremarkable.  Patient is coming in for evaluation of upper abdominal pain, left flank pain.  Has been seen for this similar issue several times in the past few months.  Does have a history of anxiety and does feel like that is fueling some of this as well.  States he has been taking Prilosec and Pepcid and he notes that occasionally this does help him.  He is primarily just complaining of abdominal pain, left upper quadrant.  Occasional pain in his left flank.  No history of kidney stones.  No dysuria.  No blood noted in his urine.  Is not nauseated and has not vomited.  States that his bowel movements have been fluctuating between diarrhea and constipation.  Reports normal bowel movement an hour prior to arrival.  No chest discomfort.  No shortness of breath.  No fevers or chills.    Exam for patient here completely unremarkable with the exception of some mild anxiety.    We will check some basic labs for patient here.  Spoke with him at length about whether or not this could be related to gastritis.  Seems that he was having issues with GERD symptoms over the past year with abdominal pain and chest discomfort, worse when lying down flat.  Discussed with him the mechanism but did find Prilosec and Pepcid and how this helps to reduce acid production.  Also discussed with him need to add on  medication such as Carafate that we will give him some protective coating in his stomach to see if this does help.  May have a component of IBD related with this.  We will try some Bentyl and see if that does help.  He has never been seen by gastroenterology but has had these ongoing stomach issues.  He states that his primary care doctors are currently out of town and he has not followed up with them recently.  Stressed that        0259 he does need to follow closely with his primary care doctor and I will put in for referral for gastroenterology as well.  Here overall he appears well.  Does not appear to be toxic in any way.  We will check some basic labs for patient here to see if there is any change from last visit about 3 weeks ago.  I do not feel there is any cardiac or pulmonary component to this.  He is low risk by Wells, PERC negative.  Not having any chest complaints at this time.  Has had full work-ups and full cardiac work-ups in the past that have been unremarkable.  We will proceed with blood work, oral medications and referral to gastroenterology.   0259 Urinalysis here is unremarkable.     Labs here unremarkable.  Patient discharged home, follow-up with GI, primary care.    Medical Decision Making    History:    Supplemental history from: Documented in chart, if applicable    External Record(s) reviewed: Documented in chart, if applicable.    Work Up:    Chart documentation includes differential considered and any EKGs or imaging independently interpreted by provider, where specified.    In additional to work up documented, I considered the following work up: Documented in chart, if applicable.    External consultation:    Discussion of management with another provider: Documented in chart, if applicable    Complicating factors:    Care impacted by chronic illness: Mental Health    Care affected by social determinants of health: N/A    Disposition considerations: Discharge. I prescribed additional  prescription strength medication(s) as charted. See documentation for any additional details.          Critical care: 0 minutes excluding separately billable procedures.  Includes bedside management, time reviewing test results, review of records, discussing the case with staff, documenting the medical record and time spent with family members (or surrogate decision makers) discussing specific treatment issues.          ED COURSE:  2:45 AM I met with the patient, obtained history, performed an initial exam, and discussed options and plan for diagnostics and treatment here in the ED.    The importance of close follow up was discussed. We reviewed warning signs and symptoms, and I instructed Mr. Garcia to return to the emergency department immediately if he develops any new or worsening symptoms. I provided additional verbal discharge instructions. Mr. Garcia expressed understanding and agreement with this plan of care, his questions were answered, and he was discharged in stable condition.     MEDICATIONS GIVEN IN THE EMERGENCY:  Medications   sucralfate (CARAFATE) tablet 1 g (1 g Oral Given 2/18/23 0318)   dicyclomine (BENTYL) capsule 20 mg (20 mg Oral Given 2/18/23 0318)       NEW PRESCRIPTIONS STARTED AT TODAY'S ER VISIT:  New Prescriptions    DICYCLOMINE (BENTYL) 20 MG TABLET    Take 1 tablet (20 mg) by mouth 2 times daily as needed (abdominal cramping or diarrhea)    SUCRALFATE (CARAFATE) 1 GM TABLET    Take 1 tablet (1 g) by mouth 3 times daily for 30 days          =================================================================    HPI    Patient information was obtained from: Patient    Use of : N/A        Lui AMISHA Garcia is a 32 year old male with a pertinent history of anxiety who presents to the ED via walk-in for the evaluation of abdominal pain and flank pain.    Patient reports left upper quadrant abdominal pain and intermittent left flank pain. He states that he has been taking Prilosec and  Pepcid with occasional relief. He states that he has been seen for similar symptoms in the last few months. Patient reports a history of anxiety and does feel that it is attributing to his symptoms as well. Patient also reports his bowel movements have been fluctuating between diarrhea and constipation although mentions he had a normal bowel movement an hour prior to ED arrival. Otherwise, he denies any dysuria, hematuria, nausea, vomiting, chest pain, shortness of breath, fever, and chills. He denies any history of kidney stones. No other complaints at this time.    Per chart review, patient was seen at [facility] Emergency Department on 1/26/23 for chest pain and anxiety. EKG with no ischemic changes. Similar to previous. Troponins were negative. No indication for D-dimer or CT scan. Patient's thyroid is normal. Urine shows few red cells. Patient discharged home with recommendation to follow-up with primary care provider.    REVIEW OF SYSTEMS   Review of Systems   Constitutional: Negative for chills and fever.   Respiratory: Negative for shortness of breath.    Cardiovascular: Negative for chest pain.   Gastrointestinal: Positive for abdominal pain (LUQ), constipation and diarrhea. Negative for nausea and vomiting.   Genitourinary: Positive for flank pain (left). Negative for dysuria and hematuria.   All other systems reviewed and are negative.    PAST MEDICAL HISTORY:  Medical history reviewed. Chart shows history of generalized anxiety disorder.    PAST SURGICAL HISTORY:  History reviewed. No pertinent surgical history.    CURRENT MEDICATIONS:    No current facility-administered medications for this encounter.    Current Outpatient Medications:      dicyclomine (BENTYL) 20 MG tablet, Take 1 tablet (20 mg) by mouth 2 times daily as needed (abdominal cramping or diarrhea), Disp: 20 tablet, Rfl: 0     sucralfate (CARAFATE) 1 GM tablet, Take 1 tablet (1 g) by mouth 3 times daily for 30 days, Disp: 90 tablet, Rfl:  "0     clonazePAM (KLONOPIN) 0.5 MG tablet, [CLONAZEPAM (KLONOPIN) 0.5 MG TABLET] Take 1 tablet (0.5 mg total) by mouth daily as needed for anxiety., Disp: 30 tablet, Rfl: 0     famotidine (PEPCID) 40 MG tablet, Take 1 tablet (40 mg) by mouth 2 times daily, Disp: 60 tablet, Rfl: 0     FLUoxetine (PROZAC) 10 MG tablet, [FLUOXETINE (PROZAC) 10 MG TABLET] Take 1/2 tablet (5 mg) x 1 week, then increase to 1 full tablet (10 mg) by mouth daily., Disp: 30 tablet, Rfl: 0     hydrOXYzine (VISTARIL) 25 MG capsule, Take 1 capsule (25 mg) by mouth 3 times daily as needed for anxiety, Disp: 21 capsule, Rfl: 0    ALLERGIES:  Allergies   Allergen Reactions     Amoxicillin      Penicillins        FAMILY HISTORY:  History reviewed. No pertinent family history.    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       PHYSICAL EXAM:    Vitals: BP (!) 147/92   Pulse 74   Temp 97.7  F (36.5  C) (Temporal)   Resp 18   Ht 2.032 m (6' 8\")   Wt 119.7 kg (263 lb 12.8 oz)   SpO2 99%   BMI 28.98 kg/m     General:. Alert and interactive, mild anxiety.  HENT: Oropharynx without erythema or exudates. MMM.  TMs clear bilaterally.  Eyes: Pupils mid-sized and equally reactive.   Neck: Full AROM.  No midline tenderness to palpation.  Cardiovascular: Regular rate and rhythm. Peripheral pulses 2+ bilaterally.  Chest/Pulmonary: Normal work of breathing. Lung sounds clear and equal throughout, no wheezes or crackles. No chest wall tenderness or deformities.  Abdomen: Soft, nondistended. Nontender without guarding or rebound.  Back/Spine: No CVA or midline tenderness.  Extremities: Normal ROM of all major joints. No lower extremity edema.   Skin: Warm and dry. Normal skin color.   Neuro: Speech " clear. CNs grossly intact. Moves all extremities appropriately. Strength and sensation grossly intact to all extremities.   Psych: Cooperative, memory appropriate.      LAB:  All pertinent labs reviewed and interpreted.  Labs Ordered and Resulted from Time of ED Arrival to Time of ED Departure   COMPREHENSIVE METABOLIC PANEL - Normal       Result Value    Sodium 141      Potassium 3.7      Chloride 104      Carbon Dioxide (CO2) 26      Anion Gap 11      Urea Nitrogen 16      Creatinine 1.03      Calcium 9.5      Glucose 96      Alkaline Phosphatase 55      AST 15      ALT 16      Protein Total 7.5      Albumin 4.3      Bilirubin Total 0.5      GFR Estimate >90     LIPASE - Normal    Lipase 30     ROUTINE UA WITH MICROSCOPIC REFLEX TO CULTURE - Normal    Color Urine Light Yellow      Appearance Urine Clear      Glucose Urine Negative      Bilirubin Urine Negative      Ketones Urine Negative      Specific Gravity Urine 1.020      Blood Urine Negative      pH Urine 6.5      Protein Albumin Urine Negative      Urobilinogen Urine <2.0      Nitrite Urine Negative      Leukocyte Esterase Urine Negative      RBC Urine <1      WBC Urine <1      Squamous Epithelials Urine <1      Hyaline Casts Urine 1     CBC WITH PLATELETS AND DIFFERENTIAL    WBC Count 5.5      RBC Count 4.92      Hemoglobin 15.4      Hematocrit 44.2      MCV 90      MCH 31.3      MCHC 34.8      RDW 11.8      Platelet Count 176      % Neutrophils 55      % Lymphocytes 28      % Monocytes 10      % Eosinophils 6      % Basophils 1      % Immature Granulocytes 0      NRBCs per 100 WBC 0      Absolute Neutrophils 3.0      Absolute Lymphocytes 1.5      Absolute Monocytes 0.5      Absolute Eosinophils 0.3      Absolute Basophils 0.1      Absolute Immature Granulocytes 0.0      Absolute NRBCs 0.0         RADIOLOGY:  No orders to display         Mary Ann FALLON, am serving as a scribe to document services personally performed by Dr. Ally Enciso  based on my  observation and the provider's statements to me. I, Ally Enciso MD attest that Mary Ann Lindsey is acting in a scribe capacity, has observed my performance of the services and has documented them in accordance with my direction.      Ally Enciso M.D.  Emergency Medicine  [facility] EMERGENCY ROOM  [address] [address]  [phone]  Dept: [phone]     Ally Enciso MD  02/18/23 0344

## 2023-02-18 NOTE — DISCHARGE INSTRUCTIONS
Your urinalysis and blood work here were all normal.  Please take medications as prescribed.  Return for any new or worsening symptoms.  Make an appointment to follow-up with gastroenterology, information above at your earliest convenience.

## 2023-02-18 NOTE — ED TRIAGE NOTES
"Here with abdominal pain, specifically on the LUQ and left flank.   Has been seen for similar issue in the past, reports \"nothing was found\".   Denies history of kidney stones  Reports belching and constipation that are new, last BM about 1 hour PTA.   Denies urinary symptoms   Took Prilosec and Pepcid a few hours ago with no relief   Also reporting anxiety feelings     Triage Assessment       Row Name 02/18/23 0240       Triage Assessment (Adult)    Airway WDL WDL       Respiratory WDL    Respiratory WDL WDL       Skin Circulation/Temperature WDL    Skin Circulation/Temperature WDL WDL       Cardiac WDL    Cardiac WDL WDL       Peripheral/Neurovascular WDL    Peripheral Neurovascular WDL WDL       Cognitive/Neuro/Behavioral WDL    Cognitive/Neuro/Behavioral WDL WDL                  "

## 2023-03-02 ENCOUNTER — HOSPITAL ENCOUNTER (EMERGENCY)
Facility: CLINIC | Age: 33
Discharge: HOME | End: 2023-03-02
Attending: EMERGENCY MEDICINE | Admitting: EMERGENCY MEDICINE

## 2023-03-02 ENCOUNTER — APPOINTMENT (OUTPATIENT)
Dept: CT IMAGING | Facility: CLINIC | Age: 33
End: 2023-03-02
Attending: EMERGENCY MEDICINE

## 2023-03-02 VITALS
RESPIRATION RATE: 18 BRPM | HEIGHT: 78 IN | TEMPERATURE: 97 F | OXYGEN SATURATION: 98 % | SYSTOLIC BLOOD PRESSURE: 137 MMHG | HEART RATE: 88 BPM | WEIGHT: 263 LBS | BODY MASS INDEX: 30.43 KG/M2 | DIASTOLIC BLOOD PRESSURE: 83 MMHG

## 2023-03-02 DIAGNOSIS — R10.12 ABDOMINAL PAIN, LEFT UPPER QUADRANT: ICD-10-CM

## 2023-03-02 DIAGNOSIS — R10.9 FLANK PAIN: ICD-10-CM

## 2023-03-02 LAB
ALBUMIN SERPL-MCNC: 4.7 G/DL (ref 3.5–5)
ALBUMIN UR-MCNC: 50 MG/DL
ALP SERPL-CCNC: 52 U/L (ref 45–120)
ALT SERPL W P-5'-P-CCNC: 20 U/L (ref 0–45)
ANION GAP SERPL CALCULATED.3IONS-SCNC: 13 MMOL/L (ref 5–18)
APPEARANCE UR: CLEAR
AST SERPL W P-5'-P-CCNC: 38 U/L (ref 0–40)
BASOPHILS # BLD AUTO: 0.1 10E3/UL (ref 0–0.2)
BASOPHILS NFR BLD AUTO: 1 %
BILIRUB SERPL-MCNC: 1.4 MG/DL (ref 0–1)
BILIRUB UR QL STRIP: NEGATIVE
BUN SERPL-MCNC: 16 MG/DL (ref 8–22)
C REACTIVE PROTEIN LHE: 0.2 MG/DL (ref 0–?)
CALCIUM SERPL-MCNC: 10 MG/DL (ref 8.5–10.5)
CHLORIDE BLD-SCNC: 101 MMOL/L (ref 98–107)
CO2 SERPL-SCNC: 27 MMOL/L (ref 22–31)
COLOR UR AUTO: YELLOW
CREAT SERPL-MCNC: 1.44 MG/DL (ref 0.7–1.3)
EOSINOPHIL # BLD AUTO: 0.2 10E3/UL (ref 0–0.7)
EOSINOPHIL NFR BLD AUTO: 3 %
ERYTHROCYTE [DISTWIDTH] IN BLOOD BY AUTOMATED COUNT: 11.9 % (ref 10–15)
GFR SERPL CREATININE-BSD FRML MDRD: 67 ML/MIN/1.73M2
GLUCOSE BLD-MCNC: 93 MG/DL (ref 70–125)
GLUCOSE UR STRIP-MCNC: NEGATIVE MG/DL
HCT VFR BLD AUTO: 44.7 % (ref 40–53)
HGB BLD-MCNC: 15.7 G/DL (ref 13.3–17.7)
HGB UR QL STRIP: NEGATIVE
IMM GRANULOCYTES # BLD: 0 10E3/UL
IMM GRANULOCYTES NFR BLD: 0 %
KETONES UR STRIP-MCNC: 60 MG/DL
LEUKOCYTE ESTERASE UR QL STRIP: NEGATIVE
LIPASE SERPL-CCNC: 17 U/L (ref 0–52)
LYMPHOCYTES # BLD AUTO: 2.6 10E3/UL (ref 0.8–5.3)
LYMPHOCYTES NFR BLD AUTO: 35 %
MCH RBC QN AUTO: 31.8 PG (ref 26.5–33)
MCHC RBC AUTO-ENTMCNC: 35.1 G/DL (ref 31.5–36.5)
MCV RBC AUTO: 91 FL (ref 78–100)
MONOCYTES # BLD AUTO: 0.8 10E3/UL (ref 0–1.3)
MONOCYTES NFR BLD AUTO: 11 %
MUCOUS THREADS #/AREA URNS LPF: PRESENT /LPF
NEUTROPHILS # BLD AUTO: 3.7 10E3/UL (ref 1.6–8.3)
NEUTROPHILS NFR BLD AUTO: 50 %
NITRATE UR QL: NEGATIVE
NRBC # BLD AUTO: 0 10E3/UL
NRBC BLD AUTO-RTO: 0 /100
PH UR STRIP: 6.5 [PH] (ref 5–7)
PLATELET # BLD AUTO: 171 10E3/UL (ref 150–450)
POTASSIUM BLD-SCNC: 3.6 MMOL/L (ref 3.5–5)
PROT SERPL-MCNC: 8.1 G/DL (ref 6–8)
RBC # BLD AUTO: 4.93 10E6/UL (ref 4.4–5.9)
RBC URINE: 2 /HPF
SODIUM SERPL-SCNC: 141 MMOL/L (ref 136–145)
SP GR UR STRIP: 1.02 (ref 1–1.03)
UROBILINOGEN UR STRIP-MCNC: 2 MG/DL
WBC # BLD AUTO: 7.4 10E3/UL (ref 4–11)
WBC URINE: 1 /HPF

## 2023-03-02 PROCEDURE — 99284 EMERGENCY DEPT VISIT MOD MDM: CPT | Mod: 25

## 2023-03-02 PROCEDURE — 80053 COMPREHEN METABOLIC PANEL: CPT | Performed by: EMERGENCY MEDICINE

## 2023-03-02 PROCEDURE — 85004 AUTOMATED DIFF WBC COUNT: CPT | Performed by: EMERGENCY MEDICINE

## 2023-03-02 PROCEDURE — 71250 CT THORAX DX C-: CPT

## 2023-03-02 PROCEDURE — 83690 ASSAY OF LIPASE: CPT | Performed by: EMERGENCY MEDICINE

## 2023-03-02 PROCEDURE — 81001 URINALYSIS AUTO W/SCOPE: CPT | Performed by: EMERGENCY MEDICINE

## 2023-03-02 PROCEDURE — 86140 C-REACTIVE PROTEIN: CPT | Performed by: EMERGENCY MEDICINE

## 2023-03-02 PROCEDURE — 36415 COLL VENOUS BLD VENIPUNCTURE: CPT | Performed by: EMERGENCY MEDICINE

## 2023-03-02 RX ORDER — LORAZEPAM 2 MG/ML
2 INJECTION INTRAMUSCULAR ONCE
Status: COMPLETED | OUTPATIENT
Start: 2023-03-02 | End: 2023-03-02

## 2023-03-02 ASSESSMENT — ACTIVITIES OF DAILY LIVING (ADL): ADLS_ACUITY_SCORE: 35

## 2023-03-02 NOTE — ED TRIAGE NOTES
Here for abdominal pain, started yesterday. History of gastritis. Has been taking pepcid, prilosec, sucralfate, and dicyclomine and unsure if it's working.   Positive for diarrhea, started a few days ago.      Triage Assessment     Row Name 03/02/23 0012       Triage Assessment (Adult)    Airway WDL WDL       Respiratory WDL    Respiratory WDL WDL       Skin Circulation/Temperature WDL    Skin Circulation/Temperature WDL WDL       Cardiac WDL    Cardiac WDL WDL       Peripheral/Neurovascular WDL    Peripheral Neurovascular WDL WDL       Cognitive/Neuro/Behavioral WDL    Cognitive/Neuro/Behavioral WDL WDL       Gaston Coma Scale    Best Eye Response 4-->(E4) spontaneous    Best Motor Response 6-->(M6) obeys commands    Best Verbal Response 5-->(V5) oriented    Minh Coma Scale Score 15

## 2023-03-02 NOTE — ED PROVIDER NOTES
EMERGENCY DEPARTMENT ENCOUnter      NAME: Lui LION White  AGE: 32 year old male  YOB: 1990  MRN: [id number]  EVALUATION DATE & TIME: No admission date for patient encounter.    PCP: No Ref-Primary, Physician    ED PROVIDER: Ewa Ortiz MD      Chief Complaint   Patient presents with     Abdominal Pain         FINAL IMPRESSION:  1. Abdominal pain, left upper quadrant    2. Flank pain          ED COURSE & MEDICAL DECISION MAKING:       In summary, the patient is a 32-year-old male that presents to the emergency department for evaluation of left sided chest and left upper quadrant abdominal pain.  I suspect the patient's pain is likely secondary to GERD and or peptic ulcer disease.  We will continue to treat symptomatically as an outpatient with close outpatient follow-up with primary care and gastroenterology.    12:22 AM I met with the patient, obtained history, performed an initial exam, and discussed options and plan for diagnostics and treatment here in the ED. PPE worn including N95 mask, surgical gloves.  Patient declined ativan.  0130-updated patient about CT and laboratory results.    At the conclusion of the encounter I discussed the results of all of the tests and the disposition. The questions were answered. The patient or family acknowledged understanding and was agreeable with the care plan.     Medical Decision Making    History:    Supplemental history from: Documented in chart, if applicable    External Record(s) reviewed: Documented in chart, if applicable.    Work Up:    Chart documentation includes differential considered and any EKGs or imaging independently interpreted by provider, where specified.    In additional to work up documented, I considered the following work up: Documented in chart, if applicable.    External consultation:    Discussion of management with another provider: Documented in chart, if applicable    Complicating factors:    Care impacted by chronic  "illness: Mental Health (anxiety)    Care affected by social determinants of health: N/A    Disposition considerations: Discharge. No recommendations on prescription strength medication(s). See documentation for any additional details.    MEDICATIONS GIVEN IN THE EMERGENCY:  Medications   LORazepam (ATIVAN) injection 2 mg (2 mg Intravenous Not Given 3/2/23 0053)       NEW PRESCRIPTIONS STARTED AT TODAY'S ER VISIT  Discharge Medication List as of 3/2/2023  2:08 AM             =================================================================    HPI        Luimacario Garcia is a 32 year old male with a pertinent history of anxiety who presents to this ED by walk in for evaluation of abdominal pain. Has been having ongoing similar symptoms since ~12/2021 for which he has been seen in the ED in the past. He currently endorses persistent left sided flank pain. En route to the ED, patient rated his pain at 8/10, but he currently he states his pain is at 5-6/10. He describes his pain as a pressure but states he is also anxious and \"can't tell what's pain from anxiety\". Patient reports his pain is worse with eating. Earlier today, patient took a dose of Pepcid, dicyclomine, and his morning dose of Carafate.    Prior to ED arrival, patient reported LUQ abdominal pain which has since resolved. Patient also currently endorses RUQ abdominal pain which he states is resolving. Patient does endorse diarrhea for the past 2-3 days with his stools normalizing more today.    Patient notes he is an  and could have possible sustained trauma to his flank. However, he states the last time he went to the gym was in 12/2022 and has not been there since then, but did go there yesterday. At that time, patient endorses strenuous exercise, but none out of the ordinary for him. Of note, patient felt his heart rate was more elevated than normal while he was at the gym, but he does not report any current palpitations.    Denies nausea, " vomiting, urinary changes, new chills (he has frequent chills due to anxiety), shortness of breath. No other reported complaints at this time. Patient is not on any daily prescription medications. He endorses an allergy to amoxicillin, penicillin.    SHx- Denies tobacco or alcohol use. Patient is a  and .    Per chart review, patient was seen at this ED on 02/18/23. Patient presented with LUQ abdominal pain and intermittent left flank pain. History of similar symptoms in the past couple of months. Patient taking Pepcid and Prilosec as an outpatient. Patient has a history of anxiety and attributed some of his symptoms to this. CMP, lipase, CBC, UA unremarkable. Patient received sucralfate, dicyclomine in the ED.    REVIEW OF SYSTEMS     Constitutional:  Denies fever or chills (new)  HENT:  Denies sore throat   Respiratory:  Denies cough or shortness of breath   Cardiovascular:  Denies chest pain or palpitations  GI:  Positive for abdominal pain (improving), diarrhea (improving). Denies nausea, or vomiting  : Positive for flank pain (left sided).  Musculoskeletal:  Denies any new extremity pain   Skin:  Denies rash   Neurologic:  Denies headache, focal weakness or sensory changes    All other systems reviewed and are negative      PAST MEDICAL HISTORY:  anxiety  PAST SURGICAL HISTORY:  History reviewed. No pertinent surgical history.        CURRENT MEDICATIONS:    clonazePAM (KLONOPIN) 0.5 MG tablet  famotidine (PEPCID) 40 MG tablet  FLUoxetine (PROZAC) 10 MG tablet  hydrOXYzine (VISTARIL) 25 MG capsule  sucralfate (CARAFATE) 1 GM tablet        ALLERGIES:  Allergies   Allergen Reactions     Amoxicillin      Penicillins        FAMILY HISTORY:  History reviewed. No pertinent family history.    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       VITALS:  Patient Vitals for the past 24 hrs:   BP Temp Temp src Pulse Resp SpO2 Height Weight   03/02/23 0010 137/83 97  F (36.1  C) Temporal 88 18 98 % 2.032 m (6' 8\") 119.3 kg (263 lb)       PHYSICAL EXAM    Constitutional:  Well developed, Well nourished,  HENT:  Normocephalic, Atraumatic, Bilateral external ears normal, Oropharynx moist, Nose normal.   Neck:  Normal range of motion, No meningismus, No stridor.   Eyes:  EOMI, Conjunctiva normal, No discharge.   Respiratory:  Normal breath sounds, No respiratory distress, No wheezing, No chest tenderness.   Cardiovascular:  Normal heart rate, Normal rhythm, No murmurs  GI:  Soft,mild luq and flank tenderness, No guarding, No CVA tenderness.   Musculoskeletal:  Neurovascularly intact distally, No edema, No tenderness, No cyanosis, Good range of motion in all major joints.   Integument:  Warm, Dry, No erythema, No rash.   Lymphatic:  No lymphadenopathy noted.   Neurologic:  Alert & oriented , Normal motor function,  No focal deficits noted.   Psychiatric:  Affect normal, Judgment normal, Mood normal.      LAB:  All pertinent labs reviewed and interpreted.  Results for orders placed or performed during the hospital encounter of 03/02/23   CT Chest Abdomen Pelvis w/o Contrast    Impression    IMPRESSION: No evidence of acute trauma in the chest, abdomen or pelvis.    Comprehensive metabolic panel   Result Value Ref Range    Sodium 141 136 - 145 mmol/L    Potassium 3.6 3.5 - 5.0 mmol/L    Chloride 101 98 - 107 mmol/L    Carbon Dioxide (CO2) 27 22 - 31 mmol/L    Anion Gap 13 5 - 18 mmol/L    Urea Nitrogen 16 8 - 22 mg/dL    Creatinine 1.44 (H) 0.70 - 1.30 mg/dL    Calcium 10.0 8.5 - 10.5 mg/dL    Glucose 93 70 - 125 mg/dL    Alkaline Phosphatase 52 45 - " 120 U/L    AST 38 0 - 40 U/L    ALT 20 0 - 45 U/L    Protein Total 8.1 (H) 6.0 - 8.0 g/dL    Albumin 4.7 3.5 - 5.0 g/dL    Bilirubin Total 1.4 (H) 0.0 - 1.0 mg/dL    GFR Estimate 67 >60 mL/min/1.73m2   Result Value Ref Range    Lipase 17 0 - 52 U/L   CRP inflammation   Result Value Ref Range    CRP 0.2 0.0 - <0.8 mg/dL   UA with Microscopic reflex to Culture    Specimen: Urine, Clean Catch   Result Value Ref Range    Color Urine Yellow Colorless, Straw, Light Yellow, Yellow    Appearance Urine Clear Clear    Glucose Urine Negative Negative mg/dL    Bilirubin Urine Negative Negative    Ketones Urine 60 (A) Negative mg/dL    Specific Gravity Urine 1.020 1.001 - 1.030    Blood Urine Negative Negative    pH Urine 6.5 5.0 - 7.0    Protein Albumin Urine 50 (A) Negative mg/dL    Urobilinogen Urine 2.0 (A) <2.0 mg/dL    Nitrite Urine Negative Negative    Leukocyte Esterase Urine Negative Negative    Mucus Urine Present (A) None Seen /LPF    RBC Urine 2 <=2 /HPF    WBC Urine 1 <=5 /HPF   CBC with platelets and differential   Result Value Ref Range    WBC Count 7.4 4.0 - 11.0 10e3/uL    RBC Count 4.93 4.40 - 5.90 10e6/uL    Hemoglobin 15.7 13.3 - 17.7 g/dL    Hematocrit 44.7 40.0 - 53.0 %    MCV 91 78 - 100 fL    MCH 31.8 26.5 - 33.0 pg    MCHC 35.1 31.5 - 36.5 g/dL    RDW 11.9 10.0 - 15.0 %    Platelet Count 171 150 - 450 10e3/uL    % Neutrophils 50 %    % Lymphocytes 35 %    % Monocytes 11 %    % Eosinophils 3 %    % Basophils 1 %    % Immature Granulocytes 0 %    NRBCs per 100 WBC 0 <1 /100    Absolute Neutrophils 3.7 1.6 - 8.3 10e3/uL    Absolute Lymphocytes 2.6 0.8 - 5.3 10e3/uL    Absolute Monocytes 0.8 0.0 - 1.3 10e3/uL    Absolute Eosinophils 0.2 0.0 - 0.7 10e3/uL    Absolute Basophils 0.1 0.0 - 0.2 10e3/uL    Absolute Immature Granulocytes 0.0 <=0.4 10e3/uL    Absolute NRBCs 0.0 10e3/uL       RADIOLOGY:  I have independently reviewed and interpreted the above imaging, pending the final radiology read.  CT Chest  Abdomen Pelvis w/o Contrast   Final Result   IMPRESSION: No evidence of acute trauma in the chest, abdomen or pelvis.                   I, Ric Mancuso, am serving as a scribe to document services personally performed by Dr. Ortiz based on my observation and the provider's statements to me. I, Ewa Ortiz MD attest that Ric Mancuso is acting in a scribe capacity, has observed my performance of the services and has documented them in accordance with my direction.    Ewa Ortiz MD  Emergency Medicine  [facility] EMERGENCY ROOM  [facility] [facility] [address] [phone]  Dept: [phone]     Ewa Ortiz MD  03/02/23 0447

## 2023-03-02 NOTE — DISCHARGE INSTRUCTIONS
Discontinue the Pepcid  Continue the omeprazole and sucralfate  Tylenol 650 mg every 4 hours as needed for pain  It is acceptable to take the hydroxyzine as needed for anxiety  Follow-up with your primary care provider within the next week for recheck

## 2023-03-07 ENCOUNTER — HOSPITAL ENCOUNTER (EMERGENCY)
Facility: CLINIC | Age: 33
Discharge: HOME | End: 2023-03-07
Attending: EMERGENCY MEDICINE | Admitting: EMERGENCY MEDICINE

## 2023-03-07 VITALS
BODY MASS INDEX: 30.86 KG/M2 | WEIGHT: 266.76 LBS | DIASTOLIC BLOOD PRESSURE: 73 MMHG | HEIGHT: 78 IN | OXYGEN SATURATION: 100 % | SYSTOLIC BLOOD PRESSURE: 123 MMHG | TEMPERATURE: 98 F | RESPIRATION RATE: 18 BRPM | HEART RATE: 75 BPM

## 2023-03-07 DIAGNOSIS — R10.13 ABDOMINAL PAIN, EPIGASTRIC: ICD-10-CM

## 2023-03-07 DIAGNOSIS — F41.9 ANXIETY: ICD-10-CM

## 2023-03-07 LAB
ATRIAL RATE - MUSE: 71 BPM
DIASTOLIC BLOOD PRESSURE - MUSE: NORMAL MMHG
INTERPRETATION ECG - MUSE: NORMAL
P AXIS - MUSE: 59 DEGREES
PR INTERVAL - MUSE: 186 MS
QRS DURATION - MUSE: 116 MS
QT - MUSE: 400 MS
QTC - MUSE: 434 MS
R AXIS - MUSE: 87 DEGREES
SYSTOLIC BLOOD PRESSURE - MUSE: NORMAL MMHG
T AXIS - MUSE: 66 DEGREES
VENTRICULAR RATE- MUSE: 71 BPM

## 2023-03-07 PROCEDURE — 250N000013 HC RX MED GY IP 250 OP 250 PS 637: Performed by: EMERGENCY MEDICINE

## 2023-03-07 PROCEDURE — 93005 ELECTROCARDIOGRAM TRACING: CPT | Performed by: EMERGENCY MEDICINE

## 2023-03-07 PROCEDURE — 99283 EMERGENCY DEPT VISIT LOW MDM: CPT

## 2023-03-07 RX ORDER — LORAZEPAM 1 MG/1
2 TABLET ORAL ONCE
Status: COMPLETED | OUTPATIENT
Start: 2023-03-07 | End: 2023-03-07

## 2023-03-07 RX ADMIN — LORAZEPAM 2 MG: 1 TABLET ORAL at 08:16

## 2023-03-07 ASSESSMENT — ENCOUNTER SYMPTOMS
FEVER: 0
NAUSEA: 0
DIAPHORESIS: 0
BACK PAIN: 0
CHILLS: 0
VOMITING: 0
BLOOD IN STOOL: 0
NERVOUS/ANXIOUS: 1
ARTHRALGIAS: 0
ABDOMINAL PAIN: 1

## 2023-03-07 ASSESSMENT — ACTIVITIES OF DAILY LIVING (ADL): ADLS_ACUITY_SCORE: 35

## 2023-03-07 NOTE — DISCHARGE INSTRUCTIONS
Continue using your omeprazole, clonazepam, fluoxetine and hydroxyzine.  You can also use the Carafate and over the famotidine as needed    You should see your primary care doctor about the anxiety as you may benefit from a referral to a psychiatrist, psychologist or counselor.    Return to the ER for any black stools, bloody stools, coffee-ground vomit, bloody vomit, fevers, worse abdominal pain or other worsening symptoms.

## 2023-03-07 NOTE — ED TRIAGE NOTES
Patient has pressure epigastric discomfort which began yesterday, pain temporarily resolved then returned around 0600. He reports a sense of doom. Last PO intake 0200-donut.      Triage Assessment     Row Name 03/07/23 0752       Triage Assessment (Adult)    Airway WDL WDL       Respiratory WDL    Respiratory WDL WDL       Skin Circulation/Temperature WDL    Skin Circulation/Temperature WDL WDL       Cardiac WDL    Cardiac WDL WDL       Cognitive/Neuro/Behavioral WDL    Cognitive/Neuro/Behavioral WDL WDL

## 2023-03-07 NOTE — ED PROVIDER NOTES
"Emergency Department Encounter      NAME: Lui Garcia  AGE: 32 year old male  YOB: 1990  MRN: [id number]  EVALUATION DATE & TIME: No admission date for patient encounter.    PCP: No Ref-Primary, Physician    ED PROVIDER: Gerard Holden M.D.      Chief Complaint   Patient presents with     Abdominal Pain         FINAL IMPRESSION:  1. Anxiety    2. Abdominal pain, epigastric        MEDICAL DECISION MAKIN:55 AM I met with the patient, obtained history, performed an initial exam, and discussed options and plan for diagnostics and treatment here in the ED.   9:06 AM I rechecked the patient and updated them on laboratory and imaging results. He reports that ativan resolved his abdominal symptoms. Discussed medication treatment of his symptoms and GI referral for endoscopy. Patient didn't want a mental health referral.     Pertinent Labs & Imaging studies reviewed. (See chart for details)    This patient has a history of anxiety disorder and panic attacks and is in the ER for evaluation of this abdominal pain.  He has been seen on  at [facility] for similar symptoms.  He was seen again at [facility] on  for the same symptoms.  During these evaluations he had normal lab work initially was treated with sucralfate and dicyclomine and Bentyl he had a chest abdomen pelvic CT done and was found to be normal.  His urine showed some elevated creatinine and protein and the patient was discharged with a consult to gastroenterology.  The patient says that he woke up at 5 or 6 in the morning with abdominal pain and pressure.  He says the pressure is so bad he \"I think I am going to die\".  The pain does not radiate anywhere is currently pain-free.  In the ER I had an EKG done to see if this was his anginal equivalent.  The EKG showed normal sinus rhythm without any signs of ST or T wave abnormality.  He received a 2 mg p.o. dose of Ativan while he was awaiting his test.  He was feeling " completely better.  I did not feel any additional work-up was needed at this point.  He was encouraged to follow-up with his omeprazole, clonazepam, fluoxetine, and hydroxyzine.  He also has Carafate and famotidine to use as well.  I did refer him to gastroenterology so that they could further investigate his abdominal pain which currently is being treated as GERD/dyspepsia.  Additionally he was encouraged to follow-up with a mental health provider such as a counselor, psychologist or psychiatrist to discuss the anxiety as I have a feeling that this plays a large part in her symptoms.    The importance of close follow up was discussed. We reviewed warning signs and symptoms, and I instructed Mr. Garcia to return to the emergency department immediately if he develops any new or worsening symptoms. I provided additional verbal discharge instructions. Mr. Garcia expressed understanding and agreement with this plan of care, his questions were answered, and he was discharged in stable condition.     Medical Decision Making    History:    Supplemental history from: Documented in chart, if applicable    External Record(s) reviewed: Documented in chart, if applicable. and Outpatient Record: Last ED visits    Work Up:    Chart documentation includes differential considered and any EKGs or imaging independently interpreted by provider, where specified.    In additional to work up documented, I considered the following work up: Documented in chart, if applicable.    External consultation:    Discussion of management with another provider: Documented in chart, if applicable    Complicating factors:    Care impacted by chronic illness: Mental Health    Care affected by social determinants of health: N/A    Disposition considerations: Discharge. I recommended the patient continue their current prescription strength medication(s): See chart. See documentation for any additional details.         MEDICATIONS GIVEN IN THE  "EMERGENCY:  Medications   LORazepam (ATIVAN) tablet 2 mg (2 mg Oral $Given 3/7/23 0816)       NEW PRESCRIPTIONS STARTED AT TODAY'S ER VISIT:  New Prescriptions    No medications on file          =================================================================    HPI    Patient information was obtained from: Patient     Use of : N/A         Luikylee Garcia is a 32 year old male with a past medical history of generalized anxiety disorder with panic attacks, and acute stress reaction who presents to the ED via walk in for evaluation of abdominal pain.    Per chart review:  2/18/2023: The patient was seen in [facility] Emergency Department presenting with left upper quadrant abdominal pain and left flank pain. History of GERD symptoms in the past couple of months with abdominal pain and chest discomfort. Patient taking Pepcid and Prilosec as an outpatient. Patient has a history of anxiety and attributed some of his symptoms to this. CMP, lipase, CBC, UA unremarkable. No imaging was done. Patient received sucralfate (1G), dicyclomine (20 MG) in the ED. Patient was prescribed bentyl (20 MG) and carafate (1 G) and plan for follow up with primary care provider and gastroenterology.     3/2/2023: The patient was seen in [facility] Emergency Department presenting with abdominal pain. CT Chest Abdomen Pelvis w/o contrast was normal. Labs showed elevated creatinine and protein albumin urine. Patient declined ativan. Patient was discharged with plan for out-patient follow up with primary care provider and gastroenterology.    Patient reports waking up at 5-6 AM this morning with abdominal pain and pressure. He felt fine when waking up at first, but noted that the pain/pressure came on suddenly. States that the pressure was worse than the pain, and was so bad \"I thought I was going to die\". The pressure is non radiating to his shoulder or back. The pressure now gone, but patient was still worried which prompted him to " present to the ED for further evaluation. Patient has a history of GERD symptoms in the past month and has been taking klonopin (0.5 MG) and prozac (10 MG) every other day for his symptoms. Also reports a headache last night that got better with tylenol and melatonin.      He denies any bloody/black stools or hematemesis. Denies any fever, nausea, vomiting, diaphoresis or new chills. He has not set up an appointment with Mental Health yet. He had a negative DVT US on his left leg recently due to leg swelling.    REVIEW OF SYSTEMS   Review of Systems   Constitutional: Negative for chills, diaphoresis and fever.   Gastrointestinal: Positive for abdominal pain. Negative for blood in stool, nausea and vomiting.   Musculoskeletal: Negative for arthralgias and back pain.   Psychiatric/Behavioral: The patient is nervous/anxious.         PAST MEDICAL HISTORY:  No past medical history on file.    PAST SURGICAL HISTORY:  No past surgical history on file.    CURRENT MEDICATIONS:    No current facility-administered medications for this encounter.    Current Outpatient Medications:      clonazePAM (KLONOPIN) 0.5 MG tablet, [CLONAZEPAM (KLONOPIN) 0.5 MG TABLET] Take 1 tablet (0.5 mg total) by mouth daily as needed for anxiety., Disp: 30 tablet, Rfl: 0     famotidine (PEPCID) 40 MG tablet, Take 1 tablet (40 mg) by mouth 2 times daily, Disp: 60 tablet, Rfl: 0     FLUoxetine (PROZAC) 10 MG tablet, [FLUOXETINE (PROZAC) 10 MG TABLET] Take 1/2 tablet (5 mg) x 1 week, then increase to 1 full tablet (10 mg) by mouth daily., Disp: 30 tablet, Rfl: 0     hydrOXYzine (VISTARIL) 25 MG capsule, Take 1 capsule (25 mg) by mouth 3 times daily as needed for anxiety, Disp: 21 capsule, Rfl: 0     sucralfate (CARAFATE) 1 GM tablet, Take 1 tablet (1 g) by mouth 3 times daily for 30 days, Disp: 90 tablet, Rfl: 0    ALLERGIES:  Allergies   Allergen Reactions     Amoxicillin      Penicillins        FAMILY HISTORY:  No family history on file.    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       PHYSICAL EXAM:    Vitals: BP (!) 142/91   Pulse 73   Temp 98  F (36.7  C) (Temporal)   Resp 20   Ht 2.032 m (6' 8\")   Wt 121 kg (266 lb 12.1 oz)   SpO2 100%   BMI 29.30 kg/m     Constitutional: Well developed, well nourished. Comfortable appearing.  HEAD:Normocephalic, atraumatic,   Eyes: PERRLA, EOM intact, conjunctiva clear, no discharge  ENT: mucous membranes moist, nose normal.   Neck- Supple, gross ROM intact.  No JVD.  No palpable nodes.  Pulmonary: Clear to auscultation bilaterally, no respiratory distress, no wheezing, speaks full sentences easily.  Chest: No chest wall tenderness  Cardiovascular: Normal heart rate, regular rhythm, no murmurs. No lower extremity edema, 2+ DP pulses.   GI: Soft, no tenderness to deep palpation in all quadrants, not distended, no masses.  No hepatosplenomegaly.  Musculoskeletal: Moving all 4 extremities intentionally and without pain. No obvious deformity.  Back: No CVA tenderness  Skin: Warm, dry, no rash.  Neurologic: Alert & oriented x 3, speech clear, moving all extremities spontaneously   Psychiatric: Affect normal, cooperative.     LAB:  All pertinent labs reviewed and interpreted.  Labs Ordered and Resulted from Time of ED Arrival to Time of ED Departure - No data to display    RADIOLOGY:  No orders to display       EKG:   Performed at: 3/7/2023 8:26   Impression: Sinus rhythm, normal ECG  Rate: 71 BPM   Rhythm: Sinus rhythm  QRS Interval: 116 ms   QTc Interval: 434 ms   Comparison: 1/26/2023 3:00   I have independently reviewed and interpreted the EKG(s) documented above.     PROCEDURES:   Procedures       I, Jose Antonio Osorio, am serving as a scribe to document " services personally performed by Dr. Gerard Holden based on my observation and the provider's statements to me. I, Gerard Holden M.D. attest that Jose Antoino Osorio is acting in a scribe capacity, has observed my performance of the services and has documented them in accordance with my direction.      Gerard Holden M.D.  Emergency Medicine  [facility] EMERGENCY ROOM  [address] [address]  [phone]  Dept: [phone]     Gerard Holden MD  03/27/23 0822

## 2023-03-07 NOTE — PROGRESS NOTES
A/ox4, no acute distress noted, pt ambulated from triage to bed E, pt states he has been having transient epigastric pain. Pt denies pain medication at this time.

## 2023-06-11 ENCOUNTER — HOSPITAL ENCOUNTER (EMERGENCY)
Facility: CLINIC | Age: 33
Discharge: HOME | End: 2023-06-11
Attending: EMERGENCY MEDICINE | Admitting: EMERGENCY MEDICINE

## 2023-06-11 VITALS
OXYGEN SATURATION: 100 % | RESPIRATION RATE: 18 BRPM | DIASTOLIC BLOOD PRESSURE: 82 MMHG | BODY MASS INDEX: 30.1 KG/M2 | HEART RATE: 84 BPM | WEIGHT: 274 LBS | SYSTOLIC BLOOD PRESSURE: 132 MMHG | TEMPERATURE: 98.8 F

## 2023-06-11 DIAGNOSIS — R10.9 ABDOMINAL CRAMPING: ICD-10-CM

## 2023-06-11 DIAGNOSIS — K29.70 GASTRITIS WITHOUT BLEEDING, UNSPECIFIED CHRONICITY, UNSPECIFIED GASTRITIS TYPE: ICD-10-CM

## 2023-06-11 LAB
ALBUMIN SERPL-MCNC: 4.3 G/DL (ref 3.5–5)
ALBUMIN UR-MCNC: 10 MG/DL
ALP SERPL-CCNC: 60 U/L (ref 45–120)
ALT SERPL W P-5'-P-CCNC: 21 U/L (ref 0–45)
ANION GAP SERPL CALCULATED.3IONS-SCNC: 9 MMOL/L (ref 5–18)
APPEARANCE UR: CLEAR
AST SERPL W P-5'-P-CCNC: 27 U/L (ref 0–40)
ATRIAL RATE - MUSE: 72 BPM
BASOPHILS # BLD AUTO: 0.1 10E3/UL (ref 0–0.2)
BASOPHILS NFR BLD AUTO: 1 %
BILIRUB DIRECT SERPL-MCNC: 0.2 MG/DL
BILIRUB SERPL-MCNC: 0.9 MG/DL (ref 0–1)
BILIRUB UR QL STRIP: NEGATIVE
BUN SERPL-MCNC: 14 MG/DL (ref 8–22)
CALCIUM SERPL-MCNC: 9.1 MG/DL (ref 8.5–10.5)
CHLORIDE BLD-SCNC: 104 MMOL/L (ref 98–107)
CO2 SERPL-SCNC: 27 MMOL/L (ref 22–31)
COLOR UR AUTO: ABNORMAL
CREAT SERPL-MCNC: 1.21 MG/DL (ref 0.7–1.3)
DIASTOLIC BLOOD PRESSURE - MUSE: NORMAL MMHG
EOSINOPHIL # BLD AUTO: 0.1 10E3/UL (ref 0–0.7)
EOSINOPHIL NFR BLD AUTO: 3 %
ERYTHROCYTE [DISTWIDTH] IN BLOOD BY AUTOMATED COUNT: 12.1 % (ref 10–15)
GFR SERPL CREATININE-BSD FRML MDRD: 82 ML/MIN/1.73M2
GLUCOSE BLD-MCNC: 108 MG/DL (ref 70–125)
GLUCOSE UR STRIP-MCNC: NEGATIVE MG/DL
HCT VFR BLD AUTO: 43.7 % (ref 40–53)
HGB BLD-MCNC: 15.1 G/DL (ref 13.3–17.7)
HGB UR QL STRIP: NEGATIVE
IMM GRANULOCYTES # BLD: 0 10E3/UL
IMM GRANULOCYTES NFR BLD: 0 %
INTERPRETATION ECG - MUSE: NORMAL
KETONES UR STRIP-MCNC: NEGATIVE MG/DL
LEUKOCYTE ESTERASE UR QL STRIP: NEGATIVE
LIPASE SERPL-CCNC: 17 U/L (ref 0–52)
LYMPHOCYTES # BLD AUTO: 1.5 10E3/UL (ref 0.8–5.3)
LYMPHOCYTES NFR BLD AUTO: 31 %
MCH RBC QN AUTO: 31.8 PG (ref 26.5–33)
MCHC RBC AUTO-ENTMCNC: 34.6 G/DL (ref 31.5–36.5)
MCV RBC AUTO: 92 FL (ref 78–100)
MONOCYTES # BLD AUTO: 0.4 10E3/UL (ref 0–1.3)
MONOCYTES NFR BLD AUTO: 9 %
MUCOUS THREADS #/AREA URNS LPF: PRESENT /LPF
NEUTROPHILS # BLD AUTO: 2.7 10E3/UL (ref 1.6–8.3)
NEUTROPHILS NFR BLD AUTO: 56 %
NITRATE UR QL: NEGATIVE
NRBC # BLD AUTO: 0 10E3/UL
NRBC BLD AUTO-RTO: 0 /100
P AXIS - MUSE: 45 DEGREES
PH UR STRIP: 8 [PH] (ref 5–7)
PLATELET # BLD AUTO: 184 10E3/UL (ref 150–450)
POTASSIUM BLD-SCNC: 3.8 MMOL/L (ref 3.5–5)
PR INTERVAL - MUSE: 186 MS
PROT SERPL-MCNC: 7.6 G/DL (ref 6–8)
QRS DURATION - MUSE: 108 MS
QT - MUSE: 392 MS
QTC - MUSE: 429 MS
R AXIS - MUSE: 79 DEGREES
RBC # BLD AUTO: 4.75 10E6/UL (ref 4.4–5.9)
RBC URINE: 1 /HPF
SODIUM SERPL-SCNC: 140 MMOL/L (ref 136–145)
SP GR UR STRIP: 1.03 (ref 1–1.03)
SYSTOLIC BLOOD PRESSURE - MUSE: NORMAL MMHG
T AXIS - MUSE: 59 DEGREES
UROBILINOGEN UR STRIP-MCNC: <2 MG/DL
VENTRICULAR RATE- MUSE: 72 BPM
WBC # BLD AUTO: 4.8 10E3/UL (ref 4–11)
WBC URINE: 1 /HPF

## 2023-06-11 PROCEDURE — 250N000013 HC RX MED GY IP 250 OP 250 PS 637: Performed by: EMERGENCY MEDICINE

## 2023-06-11 PROCEDURE — 82248 BILIRUBIN DIRECT: CPT | Performed by: EMERGENCY MEDICINE

## 2023-06-11 PROCEDURE — 83690 ASSAY OF LIPASE: CPT | Performed by: EMERGENCY MEDICINE

## 2023-06-11 PROCEDURE — 93005 ELECTROCARDIOGRAM TRACING: CPT | Performed by: EMERGENCY MEDICINE

## 2023-06-11 PROCEDURE — 80053 COMPREHEN METABOLIC PANEL: CPT | Performed by: EMERGENCY MEDICINE

## 2023-06-11 PROCEDURE — 81001 URINALYSIS AUTO W/SCOPE: CPT | Performed by: EMERGENCY MEDICINE

## 2023-06-11 PROCEDURE — 85004 AUTOMATED DIFF WBC COUNT: CPT | Performed by: EMERGENCY MEDICINE

## 2023-06-11 PROCEDURE — 36415 COLL VENOUS BLD VENIPUNCTURE: CPT | Performed by: EMERGENCY MEDICINE

## 2023-06-11 PROCEDURE — 99284 EMERGENCY DEPT VISIT MOD MDM: CPT | Mod: 25

## 2023-06-11 RX ORDER — MAGNESIUM HYDROXIDE/ALUMINUM HYDROXICE/SIMETHICONE 120; 1200; 1200 MG/30ML; MG/30ML; MG/30ML
15 SUSPENSION ORAL ONCE
Status: COMPLETED | OUTPATIENT
Start: 2023-06-11 | End: 2023-06-11

## 2023-06-11 RX ORDER — LORAZEPAM 1 MG/1
1 TABLET ORAL ONCE
Status: DISCONTINUED | OUTPATIENT
Start: 2023-06-11 | End: 2023-06-11 | Stop reason: HOSPADM

## 2023-06-11 RX ORDER — ONDANSETRON 2 MG/ML
4 INJECTION INTRAMUSCULAR; INTRAVENOUS ONCE
Status: DISCONTINUED | OUTPATIENT
Start: 2023-06-11 | End: 2023-06-11 | Stop reason: HOSPADM

## 2023-06-11 RX ADMIN — ALUMINUM HYDROXIDE, MAGNESIUM HYDROXIDE, AND DIMETHICONE 15 ML: 200; 20; 200 SUSPENSION ORAL at 07:54

## 2023-06-11 ASSESSMENT — ACTIVITIES OF DAILY LIVING (ADL): ADLS_ACUITY_SCORE: 33

## 2023-06-11 NOTE — ED PROVIDER NOTES
"  Emergency Department Encounter     Evaluation Date & Time:   6/11/2023  7:04 AM    CHIEF COMPLAINT:  Abdominal Pain and Anxiety      Triage Note:Pt states having abd pain that is lower mid abd near umbilicus. States feeling anxiety. Pt has a hx of anxiety. Pt states has a headache.                ED COURSE & MEDICAL DECISION MAKING:     ED Course as of 06/11/23 0912   Sun Jun 11, 2023   0754 Pt reassured by normal EKG.  Will send UA as well as he was told some abnormal findings on urine from last visit.  Pt did workout heavily past few days.  He also had been working out leading up to his last ED visit.  Suspect urinalysis findings and renal function from before likely secondary to that.    He did not want to take ativan or zofran, but agreeable to GI cocktail.   0759 CBC reassuring/unremarkable.   0838 UA neg for infection and reassuring here.  Labs all reassuring, unremarkable.  Cr improved to 1.2.     0847 Pt feels much better, reassured overall. Will start OTC nexium, maalox prn and follow up with his primary clinic, call [facility] for outpatient testing/follow up as well.  Pt understands return precautions.       Pt here with primarily abdominal cramping associated with recent exercise and left back muscle strain playing basketball the other day.  Pt reports increased anxiety regarding these symptoms as he worries about serious medical conditions and on most recent ER visits he had some \"numbers\" that were slightly off. Review of EMR shows pt has been seen for similar symptoms multiple times in the past, including most recently in March, 2023. Pt treated for GERD and frequently has what sounds like anxiety induced abdominal discomfort.  Pt referred to outpatient MH and GI in the past, but has not followed up yet.  Workups otherwise unremarkable. He has taken prilosec in the past with relief of his symptoms, but nothing in awhile.  Pt has some nausea, but no vomiting, fevers, bloody/black stools.  No calf " tenderness/swelling or cp/sob to suggest ACS or PE.  Will get labs, EKG, treat symptomatically and reassess.  Abdomen is quite benign and do not feel CT abd/pelvis imaging indicated at this point.      7:07 AM I met with the patient, obtained history, performed an initial exam, and discussed options and plan for diagnostics and treatment here in the ED.   8:40 AM I updated the patient.   8:46 AM I plan to discharge the patient. Understands follow up, return precautions.      Medical Decision Making    History:    Supplemental history from: Documented in chart, if applicable    External Record(s) reviewed: Documented in chart, if applicable. and Outpatient Record: 03/02/23 and 02/18/23 ED Visits    Work Up:    Chart documentation includes differential considered and any EKGs or imaging independently interpreted by provider, where specified.    In additional to work up documented, I considered the following work up: Documented in chart, if applicable.    External consultation:    Discussion of management with another provider: Documented in chart, if applicable    Complicating factors:    Care impacted by chronic illness: Mental Health    Care affected by social determinants of health: N/A    Disposition considerations: Discharge. No recommendations on prescription strength medication(s). See documentation for any additional details.      At the conclusion of the encounter I discussed the results of all the tests and the disposition. The questions were answered. The patient or family acknowledged understanding and was agreeable with the care plan.      MEDICATIONS GIVEN IN THE EMERGENCY DEPARTMENT:  Medications   LORazepam (ATIVAN) tablet 1 mg (0 mg Oral Hold 6/11/23 9404)   ondansetron (ZOFRAN) injection 4 mg (0 mg Intravenous Hold 6/11/23 3090)   alum & mag hydroxide-simethicone (MAALOX) suspension 15 mL (15 mLs Oral $Given 6/11/23 1905)       NEW PRESCRIPTIONS STARTED AT TODAY'S ED VISIT:  Discharge Medication List  "as of 6/11/2023  8:54 AM          HPI   HPI     Lui Garcia is a 33 year old male with a pertinent history of generalized anxiety disorder with panic attacks who presents to this ED with his mother for evaluation of abdominal pain and anxiety. The patient states that they had stomach problems about three months ago that he believes was most likely gastritis, with symptoms of chest and rib pain. He was given two rounds of Prilosec for two weeks which alleviated his pain and helped him return to a normal diet. The patient reports that when they were \"playing\" yesterday, they began to experience abdominal cramps and felt as if their back muscles were being pulled. Additionally, the patient's abdomen felt tender, especially with flexion. The patient notes that his abdomen began to feel better over the last few days, especially after using the bathroom and passing gas. However, this morning he began to feel sick and had nausea and reflux symptoms. The patient says that his \"head feels strange\". He denies having a fever, cough, breathing difficulties, new stomach issues, black or bloody stools and leg swelling or pain. The patient denies having prior blood clots in his legs or lungs. He has otherwise been eating and drinking normally.     The patient also reports having \"terrible\" anxiety as a result of not knowing the cause of his medical problems. He takes vistaril for his anxiety in addition to dicyclomine, the latter of which he took on the night of 06/09. The patient no longer takes Prilosec. He denies visiting a GI specialist recently and says that he does not see a primary care physician. The patient has not traveled recently.     Per chart review the patient was seen on 03/02/23 at [facility] for evaluation of abdominal pain. Comprehensive metabolic panel showed elevated levels of creatinine, bilirubin and total protein. UA with microscopic reflex with culture showed elevated levels of ketones, protein " albumin, urobilinogen and mucus. Results from CRP inflammation, lipase and CBC were not significant. CT chest abdomen pelvis without contrast showed no evidence of acute trauma in the chest, abdomen or pelvis. The patient was discharged.     Per chart review the patient was seen on 02/18/23 at [facility] ER for evaluation of abdominal and flank pain. Comprehensive metabolic panel, lipase, CBC, UA showed no significant results. The patient was given 1 g tablets of sucralfate and 20 mg capsules of dicyclomine. He was discharged with recommendation to follow up with primary care provider.     REVIEW OF SYSTEMS:  See HPI      Medical History   History reviewed. No pertinent past medical history.    History reviewed. No pertinent surgical history.    History reviewed. No pertinent family history.    Social History     Tobacco Use     Smoking status: Never   Substance Use Topics     Alcohol use: No     Drug use: No       clonazePAM (KLONOPIN) 0.5 MG tablet  famotidine (PEPCID) 40 MG tablet  FLUoxetine (PROZAC) 10 MG tablet  hydrOXYzine (VISTARIL) 25 MG capsule        Physical Exam     Vitals:  /82   Pulse 84   Temp 98.8  F (37.1  C) (Oral)   Resp 18   Wt 124.3 kg (274 lb)   SpO2 100%   BMI 30.10 kg/m      PHYSICAL EXAM:   Physical Exam  Vitals and nursing note reviewed.   Constitutional:       Appearance: Normal appearance.      Comments: Appears anxious and is tremulous   HENT:      Head: Normocephalic and atraumatic.      Nose: Nose normal.      Mouth/Throat:      Mouth: Mucous membranes are moist.   Eyes:      Pupils: Pupils are equal, round, and reactive to light.   Cardiovascular:      Rate and Rhythm: Normal rate and regular rhythm.      Pulses: Normal pulses.           Radial pulses are 2+ on the right side and 2+ on the left side.        Dorsalis pedis pulses are 2+ on the right side and 2+ on the left side.   Pulmonary:      Effort: Pulmonary effort is normal. No respiratory distress.      Breath  sounds: Normal breath sounds.   Abdominal:      General: There is no distension.      Palpations: Abdomen is soft.      Tenderness: There is no abdominal tenderness. Negative signs include McBurney's sign.      Comments: Negative for rigidity.    Musculoskeletal:      Cervical back: Full passive range of motion without pain and neck supple.      Comments: No calf tenderness or swelling b/l   Skin:     General: Skin is warm.      Comments: No rash on back or abdomen.   Neurological:      General: No focal deficit present.      Mental Status: He is alert. Mental status is at baseline.      Comments: Fluent speech, no acute lateralizing deficits   Psychiatric:         Mood and Affect: Mood is anxious.         Behavior: Behavior normal.         Results     LAB:  All pertinent labs reviewed and interpreted  Labs Ordered and Resulted from Time of ED Arrival to Time of ED Departure   ROUTINE UA WITH MICROSCOPIC REFLEX TO CULTURE - Abnormal       Result Value    Color Urine Light Yellow      Appearance Urine Clear      Glucose Urine Negative      Bilirubin Urine Negative      Ketones Urine Negative      Specific Gravity Urine 1.027      Blood Urine Negative      pH Urine 8.0 (*)     Protein Albumin Urine 10 (*)     Urobilinogen Urine <2.0      Nitrite Urine Negative      Leukocyte Esterase Urine Negative      Mucus Urine Present (*)     RBC Urine 1      WBC Urine 1     BASIC METABOLIC PANEL - Normal    Sodium 140      Potassium 3.8      Chloride 104      Carbon Dioxide (CO2) 27      Anion Gap 9      Urea Nitrogen 14      Creatinine 1.21      Calcium 9.1      Glucose 108      GFR Estimate 82     LIPASE - Normal    Lipase 17     HEPATIC FUNCTION PANEL - Normal    Bilirubin Total 0.9      Bilirubin Direct 0.2      Protein Total 7.6      Albumin 4.3      Alkaline Phosphatase 60      AST 27      ALT 21     CBC WITH PLATELETS AND DIFFERENTIAL    WBC Count 4.8      RBC Count 4.75      Hemoglobin 15.1      Hematocrit 43.7      MCV  92      MCH 31.8      MCHC 34.6      RDW 12.1      Platelet Count 184      % Neutrophils 56      % Lymphocytes 31      % Monocytes 9      % Eosinophils 3      % Basophils 1      % Immature Granulocytes 0      NRBCs per 100 WBC 0      Absolute Neutrophils 2.7      Absolute Lymphocytes 1.5      Absolute Monocytes 0.4      Absolute Eosinophils 0.1      Absolute Basophils 0.1      Absolute Immature Granulocytes 0.0      Absolute NRBCs 0.0         RADIOLOGY:  No orders to display                ECG:  NSR, rate 72, normal intervals, no acute ischemia    I have independently reviewed and interpreted the EKG(s) documented above     PROCEDURES:  Procedures:  none      FINAL IMPRESSION:    ICD-10-CM    1. Gastritis without bleeding, unspecified chronicity, unspecified gastritis type  K29.70       2. Abdominal cramping  R10.9           0 minutes of critical care time      I, Kimmy Moeller, am serving as a scribe to document services personally performed by Dr. Choco Garza, based on my observations and the provider's statements to me. I, Choco Garza, DO attest that Kimmy Moeller is acting in a scribe capacity, has observed my performance of the services and has documented them in accordance with my direction.      Choco Garza DO  Emergency Medicine  [facility] EMERGENCY ROOM  6/11/2023  7:20 AM            Choco Garza MD  06/11/23 0917

## 2023-06-11 NOTE — DISCHARGE INSTRUCTIONS
Take over the counter nexium daily for the next 2 weeks or so. You can also take maalox or tums as needed first few days for symptoms while nexium starts to take effect.  Follow up closely with primary clinic and consider calling [facility] or other GI service for outpatient follow up and further testing.

## 2023-06-11 NOTE — ED TRIAGE NOTES
Pt states having abd pain that is lower mid abd near umbilicus. States feeling anxiety. Pt has a hx of anxiety. Pt states has a headache.

## 2023-10-29 ENCOUNTER — HEALTH MAINTENANCE LETTER (OUTPATIENT)
Age: 33
End: 2023-10-29

## 2024-12-21 ENCOUNTER — HEALTH MAINTENANCE LETTER (OUTPATIENT)
Age: 34
End: 2024-12-21